# Patient Record
Sex: FEMALE | Race: WHITE | Employment: OTHER | URBAN - METROPOLITAN AREA
[De-identification: names, ages, dates, MRNs, and addresses within clinical notes are randomized per-mention and may not be internally consistent; named-entity substitution may affect disease eponyms.]

---

## 2017-03-06 NOTE — TELEPHONE ENCOUNTER
Patient reports that she is nearly out of BP meds. She is scheduled for a follow-up appt with Dr Mario Qureshi on 3/13/2017 (soonest available appt) but she will run out of medication before that appt.      Current Outpatient Prescriptions:  LOSARTAN POTASSIUM 50

## 2017-03-07 RX ORDER — LOSARTAN POTASSIUM 50 MG/1
TABLET ORAL
Qty: 30 TABLET | Refills: 0 | Status: SHIPPED | OUTPATIENT
Start: 2017-03-07 | End: 2017-03-07

## 2017-03-07 NOTE — TELEPHONE ENCOUNTER
Please advise in regards to refill request - patient has scheduled appt with [facility] 3/13/17 - almost out of meds - please advise

## 2017-03-08 RX ORDER — LOSARTAN POTASSIUM 50 MG/1
TABLET ORAL
Qty: 30 TABLET | Refills: 0 | Status: SHIPPED | OUTPATIENT
Start: 2017-03-08 | End: 2017-03-13

## 2017-03-13 ENCOUNTER — OFFICE VISIT (OUTPATIENT)
Dept: INTERNAL MEDICINE CLINIC | Facility: CLINIC | Age: 72
End: 2017-03-13

## 2017-03-13 VITALS
WEIGHT: 137.81 LBS | SYSTOLIC BLOOD PRESSURE: 158 MMHG | DIASTOLIC BLOOD PRESSURE: 88 MMHG | BODY MASS INDEX: 22.15 KG/M2 | HEART RATE: 103 BPM | HEIGHT: 66 IN

## 2017-03-13 DIAGNOSIS — E78.2 MIXED HYPERLIPIDEMIA: ICD-10-CM

## 2017-03-13 DIAGNOSIS — Z72.0 TOBACCO ABUSE: ICD-10-CM

## 2017-03-13 DIAGNOSIS — I10 UNCONTROLLED HYPERTENSION: Primary | ICD-10-CM

## 2017-03-13 DIAGNOSIS — M81.0 OSTEOPOROSIS: ICD-10-CM

## 2017-03-13 PROCEDURE — 99212 OFFICE O/P EST SF 10 MIN: CPT | Performed by: INTERNAL MEDICINE

## 2017-03-13 PROCEDURE — 99214 OFFICE O/P EST MOD 30 MIN: CPT | Performed by: INTERNAL MEDICINE

## 2017-03-13 RX ORDER — LOSARTAN POTASSIUM 100 MG/1
100 TABLET ORAL DAILY
Qty: 90 TABLET | Refills: 3 | Status: SHIPPED | OUTPATIENT
Start: 2017-03-13 | End: 2018-05-24

## 2017-03-14 NOTE — PROGRESS NOTES
HPI:    Patient ID: Lizzy Kellogg is a 80year old female.     HPI      HTN  Long standing history of hypertension  More than a year mary  Status::::: UNcontrolled:::::::::::::::::::::::::::::y   sympotms  :        Headache no  dizziness        no Review of Systems   Constitutional: Negative for fever, chills, activity change and fatigue. HENT: Negative for ear discharge, nosebleeds, postnasal drip, rhinorrhea, sinus pressure and sore throat.     Eyes: Negative for pain, discharge a Problem Relation Age of Onset   • Other[Other] [OTHER] Father      Cirrhosis   • Heart Disease Mother    • atrial fib [Other] [OTHER] Sister    • bowel resection [Other] [OTHER] Sister    • alive and well [Other] [OTHER] Daughter      x3      Social Hist CALCIUM      8.5-10.5 mg/dL 8.9   ALT (SGPT)      14-54 U/L 13 (L)   AST (SGOT)      15-41 U/L 17   ALKALINE PHOSPHATASE       U/L 74   Total Bilirubin      0.3-1.2 mg/dL 0.5   TOTAL PROTEIN      5.9-8.4 g/dL 6.7   Albumin      3.5-4.8 g/dL 3.7   G is 6 yrs out from the most recent one.  She had CBE with PCP recently and is scheduled for mammogram.  She can continue with PCP for yearly H+P with CBE and mammogram for surveillance of breast cancer.  If new signs or symptoms of recurrence, should be ref

## 2018-05-24 RX ORDER — LOSARTAN POTASSIUM 100 MG/1
TABLET ORAL
Qty: 30 TABLET | Refills: 0 | Status: SHIPPED | OUTPATIENT
Start: 2018-05-24 | End: 2018-06-04

## 2018-05-24 NOTE — TELEPHONE ENCOUNTER
Pt has scheduled a f/u appt on Monday 6/4/2018 and req a refill to hold her over to this appointment due to being almost out of meds, has 9 pills left which will last her until Saturday, 6/2/2018

## 2018-05-24 NOTE — TELEPHONE ENCOUNTER
Please advise on refill request    Call center please call and schedule an appointment. Thank You.     Hypertensive Medications  Protocol Criteria:  · Appointment scheduled in the past 6 months or in the next 3 months  · BMP or CMP in the past 12 months  ·

## 2018-05-25 RX ORDER — LOSARTAN POTASSIUM 100 MG/1
TABLET ORAL
Qty: 90 TABLET | Refills: 0 | OUTPATIENT
Start: 2018-05-25

## 2018-06-04 ENCOUNTER — OFFICE VISIT (OUTPATIENT)
Dept: INTERNAL MEDICINE CLINIC | Facility: CLINIC | Age: 73
End: 2018-06-04

## 2018-06-04 VITALS
TEMPERATURE: 98 F | WEIGHT: 134 LBS | BODY MASS INDEX: 21.53 KG/M2 | HEART RATE: 114 BPM | DIASTOLIC BLOOD PRESSURE: 76 MMHG | SYSTOLIC BLOOD PRESSURE: 174 MMHG | HEIGHT: 66 IN

## 2018-06-04 DIAGNOSIS — Z90.11 STATUS POST RIGHT MASTECTOMY: ICD-10-CM

## 2018-06-04 DIAGNOSIS — Z12.31 VISIT FOR SCREENING MAMMOGRAM: ICD-10-CM

## 2018-06-04 DIAGNOSIS — E78.2 MIXED HYPERLIPIDEMIA: ICD-10-CM

## 2018-06-04 DIAGNOSIS — D75.839 THROMBOCYTOSIS: ICD-10-CM

## 2018-06-04 DIAGNOSIS — Z85.3 HISTORY OF RIGHT BREAST CANCER: ICD-10-CM

## 2018-06-04 DIAGNOSIS — I10 ESSENTIAL HYPERTENSION: Primary | ICD-10-CM

## 2018-06-04 DIAGNOSIS — M81.0 AGE-RELATED OSTEOPOROSIS WITHOUT CURRENT PATHOLOGICAL FRACTURE: ICD-10-CM

## 2018-06-04 DIAGNOSIS — Z72.0 TOBACCO ABUSE: ICD-10-CM

## 2018-06-04 PROCEDURE — 99214 OFFICE O/P EST MOD 30 MIN: CPT | Performed by: INTERNAL MEDICINE

## 2018-06-04 PROCEDURE — G0463 HOSPITAL OUTPT CLINIC VISIT: HCPCS | Performed by: INTERNAL MEDICINE

## 2018-06-04 RX ORDER — AMLODIPINE BESYLATE 5 MG/1
5 TABLET ORAL DAILY
Qty: 30 TABLET | Refills: 11 | Status: SHIPPED | OUTPATIENT
Start: 2018-06-04 | End: 2019-05-20

## 2018-06-04 RX ORDER — LOSARTAN POTASSIUM 100 MG/1
100 TABLET ORAL
Qty: 30 TABLET | Refills: 11 | Status: SHIPPED | OUTPATIENT
Start: 2018-06-04 | End: 2019-06-21

## 2018-06-04 NOTE — PROGRESS NOTES
HPI:    Patient ID: Sandra Rosales is a 71year old female.     HPI    HTN  Long standing history of hypertension     sympotms  :        Headache no  dizziness        no                             Blurred vision no  palpitaionsSyncope no  Chest pain  no Psychiatric/Behavioral: Negative for agitation and behavioral problems. Current Outpatient Prescriptions:  Calcium Carbonate (CALTRATE 600 OR) Take by mouth.  Disp:  Rfl:    AmLODIPine Besylate 5 MG Oral Tab Take 1 tablet (5 mg total) by mouth d Conjunctivae and EOM are normal. Pupils are equal, round, and reactive to light. Right eye exhibits no discharge. Left eye exhibits no discharge. No scleral icterus. Neck: Neck supple. No thyromegaly present.    Cardiovascular: Normal rate, regular rhythm Hematocrit      35.0 - 48.0 % 46.1   MCV      80.0 - 100.0 fL 96.7   MCH      27.0 - 32.0 pg 32.5 (H)   MCHC      32.0 - 37.0 g/dl 33.6   RDW      11.0 - 15.0 % 12.8   Platelet Count      288 - 400 K/ (H)   MEAN PLATELET VOLUME      7.4 - 10.3 fL 9 pathological fracture  Plan: ALK PHOSPHATASE, BONE SPECIFIC, PTH, INTACT,         VITAMIN D, 25-HYDROXY, ENDOCRINOLOGY - INTERNAL        asymptoamtic  Monitor  Follow up in 1 mnth      Meds This Visit:  Signed Prescriptions Disp Refills    AmLODIPine Besyl

## 2018-06-11 ENCOUNTER — APPOINTMENT (OUTPATIENT)
Dept: LAB | Age: 73
End: 2018-06-11
Attending: INTERNAL MEDICINE
Payer: MEDICARE

## 2018-06-11 DIAGNOSIS — M81.0 AGE-RELATED OSTEOPOROSIS WITHOUT CURRENT PATHOLOGICAL FRACTURE: ICD-10-CM

## 2018-06-11 DIAGNOSIS — E78.2 MIXED HYPERLIPIDEMIA: ICD-10-CM

## 2018-06-11 DIAGNOSIS — I10 ESSENTIAL HYPERTENSION: ICD-10-CM

## 2018-06-11 PROCEDURE — 83970 ASSAY OF PARATHORMONE: CPT

## 2018-06-11 PROCEDURE — 82306 VITAMIN D 25 HYDROXY: CPT

## 2018-06-11 PROCEDURE — 80061 LIPID PANEL: CPT

## 2018-06-11 PROCEDURE — 80053 COMPREHEN METABOLIC PANEL: CPT

## 2018-06-11 PROCEDURE — 36415 COLL VENOUS BLD VENIPUNCTURE: CPT

## 2018-06-11 PROCEDURE — 85027 COMPLETE CBC AUTOMATED: CPT

## 2018-06-11 PROCEDURE — 84439 ASSAY OF FREE THYROXINE: CPT

## 2018-06-11 PROCEDURE — 84080 ASSAY ALKALINE PHOSPHATASES: CPT

## 2018-06-11 PROCEDURE — 84443 ASSAY THYROID STIM HORMONE: CPT

## 2018-06-14 ENCOUNTER — TELEPHONE (OUTPATIENT)
Dept: INTERNAL MEDICINE CLINIC | Facility: CLINIC | Age: 73
End: 2018-06-14

## 2018-06-15 RX ORDER — ERGOCALCIFEROL 1.25 MG/1
CAPSULE ORAL
Qty: 13 CAPSULE | Refills: 0 | OUTPATIENT
Start: 2018-06-15

## 2018-06-15 NOTE — TELEPHONE ENCOUNTER
Spoke with patient (verified name and ), reviewed information, Pt states that she feels fine and did not feel ill the day she had her blood drawn. She states that she always bruises easily and has since receiving chemo 20 years ago.     Discussed low cho

## 2018-06-15 NOTE — TELEPHONE ENCOUNTER
Jackie Yates MD   Em Rn Triage 11 hours ago (10:35 PM)      Low vit D     perscription sent to pharmacy for      WBC and platelets are high   Are you feeling well?    One of my nurses will call you   High lipids  Low cholesterol diet advised   Ot

## 2018-09-04 ENCOUNTER — NURSE TRIAGE (OUTPATIENT)
Dept: OTHER | Age: 73
End: 2018-09-04

## 2018-09-04 NOTE — TELEPHONE ENCOUNTER
Action Requested: Summary for Provider     []  Critical Lab, Recommendations Needed  [x] Need Additional Advice  []   FYI    []   Need Orders  [] Need Medications Sent to Pharmacy  []  Other     SUMMARY: Pt states pain originates left buttock and radiates

## 2018-09-05 NOTE — TELEPHONE ENCOUNTER
Patient called and stated she does feel and did not go to the ED. Her back pain is gone and her numbness is much better. She can walk. I offered an appointment and she refused. If needed she will call us back.     If the pain comes back she will helena

## 2018-09-09 RX ORDER — ERGOCALCIFEROL 1.25 MG/1
50000 CAPSULE ORAL
Qty: 3 CAPSULE | Refills: 3 | Status: SHIPPED | OUTPATIENT
Start: 2018-09-09 | End: 2019-07-03 | Stop reason: ALTCHOICE

## 2019-05-20 RX ORDER — AMLODIPINE BESYLATE 5 MG/1
TABLET ORAL
Qty: 90 TABLET | Refills: 0 | Status: SHIPPED | OUTPATIENT
Start: 2019-05-20 | End: 2019-07-03

## 2019-05-20 NOTE — TELEPHONE ENCOUNTER
Patient failed protocol, overdue for OV. Refilled for 90 days, mychart message sent.     Hypertensive Medications  Protocol Criteria:  · Appointment scheduled in the past 6 months or in the next 3 months  · BMP or CMP in the past 12 months  · Creatinine res

## 2019-06-21 NOTE — TELEPHONE ENCOUNTER
CSS, please contact patient and assist in scheduling overdue f/u or Px as has not been seen in over a year.   My [employer] message had previously been sent in May but pt has not yet read it      Hypertensive Medications  Protocol Criteria:  · Appointment schedul

## 2019-06-22 NOTE — TELEPHONE ENCOUNTER
pls advise, thanks in advance.      Future Appointments   Date Time Provider Saad Quiñones   7/3/2019  9:40 AM MD ELMO Mohan  ADO         Requested Prescriptions     Pending Prescriptions Disp Refills   • LOSARTAN 100 MG Oral Tab Ross Friendly

## 2019-06-23 RX ORDER — LOSARTAN POTASSIUM 100 MG/1
TABLET ORAL
Qty: 30 TABLET | Refills: 0 | Status: SHIPPED | OUTPATIENT
Start: 2019-06-23 | End: 2019-06-24

## 2019-06-26 RX ORDER — LOSARTAN POTASSIUM 100 MG/1
TABLET ORAL
Qty: 30 TABLET | Refills: 0 | OUTPATIENT
Start: 2019-06-26 | End: 2019-07-03

## 2019-07-03 ENCOUNTER — OFFICE VISIT (OUTPATIENT)
Dept: INTERNAL MEDICINE CLINIC | Facility: CLINIC | Age: 74
End: 2019-07-03
Payer: MEDICARE

## 2019-07-03 VITALS
SYSTOLIC BLOOD PRESSURE: 142 MMHG | HEART RATE: 114 BPM | HEIGHT: 66 IN | BODY MASS INDEX: 21.21 KG/M2 | WEIGHT: 132 LBS | TEMPERATURE: 98 F | DIASTOLIC BLOOD PRESSURE: 84 MMHG

## 2019-07-03 DIAGNOSIS — Z12.31 VISIT FOR SCREENING MAMMOGRAM: ICD-10-CM

## 2019-07-03 DIAGNOSIS — Z85.3 HISTORY OF RIGHT BREAST CANCER: ICD-10-CM

## 2019-07-03 DIAGNOSIS — F17.219 CIGARETTE NICOTINE DEPENDENCE WITH NICOTINE-INDUCED DISORDER: ICD-10-CM

## 2019-07-03 DIAGNOSIS — Z78.0 POSTMENOPAUSAL: ICD-10-CM

## 2019-07-03 DIAGNOSIS — Z72.0 TOBACCO ABUSE: ICD-10-CM

## 2019-07-03 DIAGNOSIS — Z00.00 ENCOUNTER FOR ANNUAL HEALTH EXAMINATION: ICD-10-CM

## 2019-07-03 DIAGNOSIS — M81.0 AGE-RELATED OSTEOPOROSIS WITHOUT CURRENT PATHOLOGICAL FRACTURE: ICD-10-CM

## 2019-07-03 DIAGNOSIS — Z00.00 ENCOUNTER FOR MEDICARE ANNUAL WELLNESS EXAM: Primary | ICD-10-CM

## 2019-07-03 DIAGNOSIS — E78.2 MIXED HYPERLIPIDEMIA: ICD-10-CM

## 2019-07-03 DIAGNOSIS — I10 ESSENTIAL HYPERTENSION: ICD-10-CM

## 2019-07-03 DIAGNOSIS — Z90.11 STATUS POST RIGHT MASTECTOMY: ICD-10-CM

## 2019-07-03 PROCEDURE — G0463 HOSPITAL OUTPT CLINIC VISIT: HCPCS | Performed by: INTERNAL MEDICINE

## 2019-07-03 PROCEDURE — 99213 OFFICE O/P EST LOW 20 MIN: CPT | Performed by: INTERNAL MEDICINE

## 2019-07-03 PROCEDURE — G0439 PPPS, SUBSEQ VISIT: HCPCS | Performed by: INTERNAL MEDICINE

## 2019-07-03 RX ORDER — AMLODIPINE BESYLATE 5 MG/1
TABLET ORAL
Qty: 90 TABLET | Refills: 3 | Status: SHIPPED | OUTPATIENT
Start: 2019-07-03 | End: 2020-08-31

## 2019-07-03 RX ORDER — AMLODIPINE BESYLATE 5 MG/1
TABLET ORAL
Qty: 90 TABLET | Refills: 3 | Status: SHIPPED | OUTPATIENT
Start: 2019-07-03 | End: 2020-08-18

## 2019-07-03 RX ORDER — LOSARTAN POTASSIUM 100 MG/1
100 TABLET ORAL
Qty: 90 TABLET | Refills: 3 | Status: SHIPPED | OUTPATIENT
Start: 2019-07-03 | End: 2020-07-14

## 2019-07-03 NOTE — PROGRESS NOTES
HPI:    Patient ID: Bola Calderon is a 68year old female.     HPI    /84 (BP Location: Right arm, Patient Position: Sitting, Cuff Size: adult)   Pulse 114   Temp 97.7 °F (36.5 °C) (Oral)   Ht 5' 6\" (1.676 m)   Wt 132 lb (59.9 kg)   LMP  (LMP Unk Alcohol/week: 2.4 oz      Types: 4 Cans of beer per week      Comment: occasionally    Drug use: No       PHYSICAL EXAM:    Physical Exam  The 10-year ASCVD risk score (Cas Blackburn, et al., 2013) is: 28%    Values used to calculate the score:      Age: 68

## 2019-07-14 NOTE — PATIENT INSTRUCTIONS
Suly Rice's SCREENING SCHEDULE   Tests on this list are recommended by your physician but may not be covered, or covered at this frequency, by your insurer. Please check with your insurance carrier before scheduling to verify coverage.    Martha Bonilla Colorectal Cancer Screening  Covered up to Age 76     Colonoscopy Screen   Covered every 10 years- more often if abnormal Colonoscopy due on 09/03/1996 Update Health Maintenance if applicable    Flex Sigmoidoscopy Screen  Covered every 5 years No results previous visit. Please get once after your 65th birthday    Pneumococcal 23 (Pneumovax)  Covered Once after 65 No orders found for this or any previous visit.  Please get once after your 65th birthday    Hepatitis B for Moderate/High Risk       No orders fo

## 2019-07-14 NOTE — PROGRESS NOTES
HPI:   Kellie Patel is a 68year old female who presents for a Medicare Subsequent Annual Wellness visit (Pt already had Initial Annual Wellness).       Her last annual assessment has been over 1 year: Annual Physical due on 09/03/1948    Patient is he Little interest or pleasure in doing things (over the last two weeks)?: Not at all  Feeling down, depressed, or hopeless (over the last two weeks)?: Not at all  PHQ-2 SCORE: 0     Advanced Directive:   She does NOT have a Living Will on file in [address] [place] [address] Rd[facility]ars GARZA Lab Results   Component Value Date    AST 19 06/11/2018    ALT 11 (L) 06/11/2018    CA 9.3 06/11/2018    ALB 3.9 06/11/2018    TSH 1.62 06/11/2018    CREATSERUM 0.66 06/11/2018    GLU 90 06/11/2018        CBC  (most recent labs)   Lab Results   Component °F (36.5 °C) (Oral)   Ht 5' 6\" (1.676 m)   Wt 132 lb (59.9 kg)   LMP  (LMP Unknown)   BMI 21.31 kg/m²  Estimated body mass index is 21.31 kg/m² as calculated from the following:    Height as of this encounter: 5' 6\" (1.676 m).     Weight as of this encoun diagnosis)  Plan: Patient is independent with ADL.s    Health screening   Colonoscopy, mammography, dexa scan  And routine eye exam advised.       (I10) Essential hypertension  Plan: URINE MICROSCOPIC W REFLEX CULTURE        /84 (BP Location: Right ar Smoking cessation advised         Diet assessment: good     PLAN:  The patient indicates understanding of these issues and agrees to the plan. Reinforced healthy diet, lifestyle, and exercise. No follow-ups on file.      Mitra Hutton MD, 7/13/2019 yrs age 21-68 or Pap+HPV every 5 yrs age 33-67, age 72 and older at high risk There are no preventive care reminders to display for this patient.  Update Health Maintenance if applicable    Chlamydia  Annually if high risk No results found for: CHLAMYDIA No Value   05/12/2016 0.55    No flowsheet data found. Drug Serum Conc  Annually No results found for: DIGOXIN, DIG, VALP No flowsheet data found.                Template: [facility]B MEDICARE ANNUAL ASSESSMENT FEMALE Gracy Meehan [[id number]]

## 2019-08-14 ENCOUNTER — APPOINTMENT (OUTPATIENT)
Dept: LAB | Age: 74
End: 2019-08-14
Attending: INTERNAL MEDICINE
Payer: MEDICARE

## 2019-08-14 DIAGNOSIS — E78.2 MIXED HYPERLIPIDEMIA: ICD-10-CM

## 2019-08-14 DIAGNOSIS — I10 ESSENTIAL HYPERTENSION: ICD-10-CM

## 2019-08-14 LAB
ALBUMIN SERPL-MCNC: 3.7 G/DL (ref 3.4–5)
ALBUMIN/GLOB SERPL: 1.1 {RATIO} (ref 1–2)
ALP LIVER SERPL-CCNC: 94 U/L (ref 55–142)
ALT SERPL-CCNC: 13 U/L (ref 13–56)
ANION GAP SERPL CALC-SCNC: 5 MMOL/L (ref 0–18)
AST SERPL-CCNC: 13 U/L (ref 15–37)
BILIRUB SERPL-MCNC: 0.6 MG/DL (ref 0.1–2)
BUN BLD-MCNC: 10 MG/DL (ref 7–18)
BUN/CREAT SERPL: 15.4 (ref 10–20)
CALCIUM BLD-MCNC: 9.1 MG/DL (ref 8.5–10.1)
CHLORIDE SERPL-SCNC: 109 MMOL/L (ref 98–112)
CHOLEST SMN-MCNC: 228 MG/DL (ref ?–200)
CO2 SERPL-SCNC: 26 MMOL/L (ref 21–32)
CREAT BLD-MCNC: 0.65 MG/DL (ref 0.55–1.02)
DEPRECATED RDW RBC AUTO: 43.7 FL (ref 35.1–46.3)
ERYTHROCYTE [DISTWIDTH] IN BLOOD BY AUTOMATED COUNT: 12.3 % (ref 11–15)
GLOBULIN PLAS-MCNC: 3.5 G/DL (ref 2.8–4.4)
GLUCOSE BLD-MCNC: 85 MG/DL (ref 70–99)
HCT VFR BLD AUTO: 44.2 % (ref 35–48)
HDLC SERPL-MCNC: 87 MG/DL (ref 40–59)
HGB BLD-MCNC: 15.2 G/DL (ref 12–16)
LDLC SERPL CALC-MCNC: 118 MG/DL (ref ?–100)
M PROTEIN MFR SERPL ELPH: 7.2 G/DL (ref 6.4–8.2)
MCH RBC QN AUTO: 33.1 PG (ref 26–34)
MCHC RBC AUTO-ENTMCNC: 34.4 G/DL (ref 31–37)
MCV RBC AUTO: 96.3 FL (ref 80–100)
NONHDLC SERPL-MCNC: 141 MG/DL (ref ?–130)
OSMOLALITY SERPL CALC.SUM OF ELEC: 288 MOSM/KG (ref 275–295)
PATIENT FASTING: YES
PATIENT FASTING: YES
PLATELET # BLD AUTO: 427 10(3)UL (ref 150–450)
POTASSIUM SERPL-SCNC: 4.5 MMOL/L (ref 3.5–5.1)
RBC # BLD AUTO: 4.59 X10(6)UL (ref 3.8–5.3)
RBC #/AREA URNS AUTO: 0 /HPF
SODIUM SERPL-SCNC: 140 MMOL/L (ref 136–145)
T4 FREE SERPL-MCNC: 0.8 NG/DL (ref 0.8–1.7)
TRIGL SERPL-MCNC: 116 MG/DL (ref 30–149)
TSI SER-ACNC: 1.76 MIU/ML (ref 0.36–3.74)
VLDLC SERPL CALC-MCNC: 23 MG/DL (ref 0–30)
WBC # BLD AUTO: 7.7 X10(3) UL (ref 4–11)
WBC #/AREA URNS AUTO: 1 /HPF

## 2019-08-14 PROCEDURE — 84443 ASSAY THYROID STIM HORMONE: CPT

## 2019-08-14 PROCEDURE — 36415 COLL VENOUS BLD VENIPUNCTURE: CPT

## 2019-08-14 PROCEDURE — 80061 LIPID PANEL: CPT

## 2019-08-14 PROCEDURE — 85027 COMPLETE CBC AUTOMATED: CPT

## 2019-08-14 PROCEDURE — 81015 MICROSCOPIC EXAM OF URINE: CPT

## 2019-08-14 PROCEDURE — 84439 ASSAY OF FREE THYROXINE: CPT

## 2019-08-14 PROCEDURE — 80053 COMPREHEN METABOLIC PANEL: CPT

## 2019-10-03 ENCOUNTER — HOSPITAL ENCOUNTER (OUTPATIENT)
Dept: MAMMOGRAPHY | Age: 74
Discharge: HOME | End: 2019-10-03
Attending: INTERNAL MEDICINE
Payer: MEDICARE

## 2019-10-03 ENCOUNTER — HOSPITAL ENCOUNTER (OUTPATIENT)
Dept: BONE DENSITY | Age: 74
Discharge: HOME | End: 2019-10-03
Attending: INTERNAL MEDICINE
Payer: MEDICARE

## 2019-10-03 DIAGNOSIS — Z12.31 VISIT FOR SCREENING MAMMOGRAM: ICD-10-CM

## 2019-10-03 DIAGNOSIS — Z78.0 POSTMENOPAUSAL: ICD-10-CM

## 2019-10-03 PROCEDURE — 77063 BREAST TOMOSYNTHESIS BI: CPT | Performed by: INTERNAL MEDICINE

## 2019-10-03 PROCEDURE — 77067 SCR MAMMO BI INCL CAD: CPT | Performed by: INTERNAL MEDICINE

## 2019-10-03 PROCEDURE — 77080 DXA BONE DENSITY AXIAL: CPT | Performed by: INTERNAL MEDICINE

## 2020-07-14 RX ORDER — LOSARTAN POTASSIUM 100 MG/1
TABLET ORAL
Qty: 30 TABLET | Refills: 0 | Status: SHIPPED | OUTPATIENT
Start: 2020-07-14 | End: 2020-08-17

## 2020-07-14 NOTE — TELEPHONE ENCOUNTER
Left message for pt to call back. When the patient returns the call please help make an appointment per the below.

## 2020-08-17 NOTE — TELEPHONE ENCOUNTER
Patient requests refill, pharmacy cannot initiate refill request. Patient has follow up appt 8/31    LOSARTAN 100 MG Oral Tab      amLODIPine Besylate 5 MG Oral Tab

## 2020-08-18 RX ORDER — LOSARTAN POTASSIUM 100 MG/1
100 TABLET ORAL DAILY
Qty: 30 TABLET | Refills: 0 | Status: SHIPPED | OUTPATIENT
Start: 2020-08-18 | End: 2020-08-31

## 2020-08-18 RX ORDER — AMLODIPINE BESYLATE 5 MG/1
TABLET ORAL
Qty: 30 TABLET | Refills: 0 | Status: SHIPPED | OUTPATIENT
Start: 2020-08-18 | End: 2020-08-31

## 2020-08-31 ENCOUNTER — PATIENT MESSAGE (OUTPATIENT)
Dept: INTERNAL MEDICINE CLINIC | Facility: CLINIC | Age: 75
End: 2020-08-31

## 2020-08-31 ENCOUNTER — VIRTUAL PHONE E/M (OUTPATIENT)
Dept: INTERNAL MEDICINE CLINIC | Facility: CLINIC | Age: 75
End: 2020-08-31
Payer: MEDICARE

## 2020-08-31 VITALS — DIASTOLIC BLOOD PRESSURE: 60 MMHG | SYSTOLIC BLOOD PRESSURE: 150 MMHG

## 2020-08-31 DIAGNOSIS — Z72.0 TOBACCO ABUSE: ICD-10-CM

## 2020-08-31 DIAGNOSIS — Z91.89 RISK FOR CORONARY ARTERY DISEASE GREATER THAN 20% IN NEXT 10 YEARS: ICD-10-CM

## 2020-08-31 DIAGNOSIS — I10 ESSENTIAL HYPERTENSION: Primary | ICD-10-CM

## 2020-08-31 DIAGNOSIS — E78.2 MIXED HYPERLIPIDEMIA: ICD-10-CM

## 2020-08-31 DIAGNOSIS — M81.0 AGE-RELATED OSTEOPOROSIS WITHOUT CURRENT PATHOLOGICAL FRACTURE: ICD-10-CM

## 2020-08-31 PROCEDURE — 99443 PHONE E/M BY PHYS 21-30 MIN: CPT | Performed by: INTERNAL MEDICINE

## 2020-08-31 RX ORDER — LOSARTAN POTASSIUM 100 MG/1
100 TABLET ORAL DAILY
Qty: 90 TABLET | Refills: 1 | Status: SHIPPED | OUTPATIENT
Start: 2020-08-31 | End: 2021-03-17

## 2020-08-31 RX ORDER — AMLODIPINE BESYLATE 10 MG/1
10 TABLET ORAL DAILY
Qty: 90 TABLET | Refills: 1 | Status: SHIPPED | OUTPATIENT
Start: 2020-08-31 | End: 2021-03-17

## 2020-08-31 NOTE — TELEPHONE ENCOUNTER
From: Bibiana Tao  To: Brandon Crawford MD  Sent: 8/31/2020 12:20 PM CDT  Subject: Prescription Question    I have a question my one prescription was called in but my other prescription was not for losartan will that be refilled

## 2020-08-31 NOTE — PROGRESS NOTES
HPI:    Patient ID: Rolan Carlos is a 69year old female. HPI  Virtual Telephone Check-In    Rolan Carlos verbally consents to a Virtual/Telephone Check-In visit on 08/31/20.   Patient has been referred to the [facility] website at [email or web address] dysuria, frequency, hematuria and urgency. Musculoskeletal: Negative for back pain, gait problem and joint swelling. Skin: Negative for rash. Neurological: Negative for syncope, weakness, light-headedness and headaches.    Hematological: Negative for tobacco: Current User    Alcohol use:  Yes      Alcohol/week: 4.0 standard drinks      Types: 4 Cans of beer per week      Comment: occasionally    Drug use: No       PHYSICAL EXAM:    Physical Exam  The 10-year ASCVD risk score (Eric Solis, et al., 2013) following visit was completed using two-way, real-time interactive audio and/or video communication.   This has been done in good misty to provide continuity of care in the best interest of the provider-patient relationship, due to the ongoing public health

## 2021-02-06 DIAGNOSIS — Z23 NEED FOR VACCINATION: ICD-10-CM

## 2021-03-17 ENCOUNTER — OFFICE VISIT (OUTPATIENT)
Dept: INTERNAL MEDICINE CLINIC | Facility: CLINIC | Age: 76
End: 2021-03-17
Payer: MEDICARE

## 2021-03-17 VITALS
DIASTOLIC BLOOD PRESSURE: 72 MMHG | HEIGHT: 66 IN | BODY MASS INDEX: 21.6 KG/M2 | SYSTOLIC BLOOD PRESSURE: 146 MMHG | HEART RATE: 103 BPM | WEIGHT: 134.38 LBS

## 2021-03-17 DIAGNOSIS — Z12.11 COLON CANCER SCREENING: ICD-10-CM

## 2021-03-17 DIAGNOSIS — R92.8 ABNORMAL MAMMOGRAM: ICD-10-CM

## 2021-03-17 DIAGNOSIS — Z72.0 TOBACCO ABUSE: ICD-10-CM

## 2021-03-17 DIAGNOSIS — D75.839 THROMBOCYTOSIS: ICD-10-CM

## 2021-03-17 DIAGNOSIS — Z85.3 HISTORY OF RIGHT BREAST CANCER: ICD-10-CM

## 2021-03-17 DIAGNOSIS — Z00.00 MEDICARE ANNUAL WELLNESS VISIT, SUBSEQUENT: Primary | ICD-10-CM

## 2021-03-17 DIAGNOSIS — E78.2 MIXED HYPERLIPIDEMIA: ICD-10-CM

## 2021-03-17 DIAGNOSIS — Z00.00 ENCOUNTER FOR ANNUAL HEALTH EXAMINATION: ICD-10-CM

## 2021-03-17 DIAGNOSIS — M85.80 OSTEOPENIA WITH HIGH RISK OF FRACTURE: ICD-10-CM

## 2021-03-17 DIAGNOSIS — I10 ESSENTIAL HYPERTENSION: ICD-10-CM

## 2021-03-17 DIAGNOSIS — Z91.89 RISK FOR CORONARY ARTERY DISEASE GREATER THAN 20% IN NEXT 10 YEARS: ICD-10-CM

## 2021-03-17 PROCEDURE — G0439 PPPS, SUBSEQ VISIT: HCPCS | Performed by: INTERNAL MEDICINE

## 2021-03-17 RX ORDER — LOSARTAN POTASSIUM 100 MG/1
100 TABLET ORAL DAILY
Qty: 90 TABLET | Refills: 1 | Status: SHIPPED | OUTPATIENT
Start: 2021-03-17 | End: 2021-09-07

## 2021-03-17 RX ORDER — AMLODIPINE BESYLATE 10 MG/1
10 TABLET ORAL DAILY
Qty: 90 TABLET | Refills: 1 | Status: SHIPPED | OUTPATIENT
Start: 2021-03-17 | End: 2021-09-07

## 2021-03-20 NOTE — PROGRESS NOTES
HPI:   Bola Calderon is a 77year old female who presents for a medicare annual exam      Her last annual assessment has been over 1 year: Annual Physical due on 07/03/2020         Fall/Risk Assessment   She has been screened for Falls and is low risk: risk.    Patient Care Team: Patient Care Team:  Heather Urbina MD as PCP - General (Internal Medicine)  Heather Urbina MD as PCP [facility]    Patient Active Problem List:     History of right breast cancer     Status post mastectomy     Thrombocytosis (HC sister. SOCIAL HISTORY:   She  reports that she has been smoking. She has a 53.00 pack-year smoking history. She uses smokeless tobacco. She reports current alcohol use of about 4.0 standard drinks of alcohol per week.  She reports that she does not use d trouble understanding things on the TV: No I have to strain to understand conversations: No   I have to worry about missing the telephone ring or doorbell: No I have trouble hearing conversations in a noisy background such as a crowded room or restaurant: sounds, S1 normal and S2 normal. No murmur heard. No gallop. Pulmonary:      Effort: Pulmonary effort is normal. No respiratory distress. Breath sounds: Normal breath sounds. No wheezing or rales. Chest:      Chest wall: No tenderness.    Abdomi dvsed    (Z91.89) Risk for coronary artery disease greater than 20% in next 10 years  Plan: modifyt risk for CAD\  Stop smoking  Healthy weight  Low fat diet    (Z85.3) History of right breast cancer  Plan: no known recurrence    (M85.80) Osteopenia with h Colonoscopy Never done Update Health Maintenance if applicable    Flex Sigmoidoscopy Screen every 10 years No results found for this or any previous visit. No flowsheet data found.      Fecal Occult Blood Annually No results found for: FOB No flowsheet data Zoster   Not covered by Medicare Part B No vaccine history found This may be covered with your pharmacy  prescription benefits      [facility] Internal Lab or Procedure External Lab or Procedure      Annual Monitoring of Persistent     Med

## 2021-03-20 NOTE — PATIENT INSTRUCTIONS
Migeul Rice's SCREENING SCHEDULE   Tests on this list are recommended by your physician but may not be covered, or covered at this frequency, by your insurer. Please check with your insurance carrier before scheduling to verify coverage.    Ravinder Paula if abnormal Colonoscopy Never done Update [facility] if applicable    Flex Sigmoidoscopy Screen  Covered every 5 years No results found for this or any previous visit. No flowsheet data found.      Fecal Occult Blood   Covered Annually No results fo orders found for this or any previous visit. Please get once after your 65th birthday    Hepatitis B for Moderate/High Risk       No orders found for this or any previous visit.  Medium/high risk factors:   End-stage renal disease   Hemophiliacs who receive

## 2021-05-07 ENCOUNTER — LAB ENCOUNTER (OUTPATIENT)
Dept: LAB | Age: 76
End: 2021-05-07
Attending: INTERNAL MEDICINE
Payer: MEDICARE

## 2021-05-07 DIAGNOSIS — M85.80 OSTEOPENIA WITH HIGH RISK OF FRACTURE: ICD-10-CM

## 2021-05-07 DIAGNOSIS — E78.2 MIXED HYPERLIPIDEMIA: ICD-10-CM

## 2021-05-07 DIAGNOSIS — I10 ESSENTIAL HYPERTENSION: ICD-10-CM

## 2021-05-07 PROCEDURE — 83970 ASSAY OF PARATHORMONE: CPT

## 2021-05-07 PROCEDURE — 82306 VITAMIN D 25 HYDROXY: CPT

## 2021-05-07 PROCEDURE — 81015 MICROSCOPIC EXAM OF URINE: CPT

## 2021-05-07 PROCEDURE — 80061 LIPID PANEL: CPT

## 2021-05-07 PROCEDURE — 36415 COLL VENOUS BLD VENIPUNCTURE: CPT

## 2021-05-07 PROCEDURE — 84439 ASSAY OF FREE THYROXINE: CPT

## 2021-05-07 PROCEDURE — 80053 COMPREHEN METABOLIC PANEL: CPT

## 2021-05-07 PROCEDURE — 84443 ASSAY THYROID STIM HORMONE: CPT

## 2021-05-07 PROCEDURE — 85027 COMPLETE CBC AUTOMATED: CPT

## 2021-05-07 PROCEDURE — 84080 ASSAY ALKALINE PHOSPHATASES: CPT

## 2021-05-12 RX ORDER — ERGOCALCIFEROL 1.25 MG/1
CAPSULE ORAL
Qty: 13 CAPSULE | Refills: 0 | Status: SHIPPED | OUTPATIENT
Start: 2021-05-12

## 2021-09-07 RX ORDER — LOSARTAN POTASSIUM 100 MG/1
100 TABLET ORAL DAILY
Qty: 90 TABLET | Refills: 1 | Status: SHIPPED | OUTPATIENT
Start: 2021-09-07 | End: 2022-03-02

## 2021-09-07 RX ORDER — AMLODIPINE BESYLATE 10 MG/1
10 TABLET ORAL DAILY
Qty: 90 TABLET | Refills: 1 | Status: SHIPPED | OUTPATIENT
Start: 2021-09-07 | End: 2022-03-02

## 2021-09-07 NOTE — TELEPHONE ENCOUNTER
Refill passed per [place] [employer], [facility] Protocol    Requested Prescriptions   Pending Prescriptions Disp Refills    LOSARTAN 100 MG Oral Tab [Pharmacy Med Name: LOSARTAN 100MG TABLETS] 90 tablet 1     Sig: TAKE 1 TABLET(100 MG) BY MOUTH DAILY        Hypertensive Medications Protocol Passed - 9/5/2021  5:13 PM        Passed - CMP or BMP in past 12 months        Passed - Appointment in past 6 or next 3 months        Passed - GFR Non- > 50     Lab Results   Component Value Date    GFRNAA 88 05/07/2021                  AMLODIPINE 10 MG Oral Tab [Pharmacy Med Name: AMLODIPINE BESYLATE 10MG TABLETS] 90 tablet 1     Sig: TAKE 1 TABLET(10 MG) BY MOUTH DAILY        Hypertensive Medications Protocol Passed - 9/5/2021  5:13 PM        Passed - CMP or BMP in past 12 months        Passed - Appointment in past 6 or next 3 months        Passed - GFR Non- > 50     Lab Results   Component Value Date    [facility] 05/07[facility]                     Recent Outpatient Visits              5 months ago Medicare annual wellness visit, subsequent    Swati Watson MD    Office Visit    1 year ago Essential hypertension    Swati Watson MD    Virtual Phone E/M    2 years ago Encounter for [employer] annual wellness exam    Swati Watson MD    Office Visit    3 years ago Essential hypertension    Fatmata Torres, Amarilys Chan MD    Office Visit    4 years ago Uncontrolled hypertension; advised compliance with meds.     Swati Watson MD    Office Visit

## 2022-03-02 RX ORDER — LOSARTAN POTASSIUM 100 MG/1
100 TABLET ORAL DAILY
Qty: 30 TABLET | Refills: 0 | Status: SHIPPED | OUTPATIENT
Start: 2022-03-02 | End: 2022-04-06

## 2022-03-02 RX ORDER — AMLODIPINE BESYLATE 10 MG/1
TABLET ORAL
Qty: 90 TABLET | Refills: 1 | Status: SHIPPED | OUTPATIENT
Start: 2022-03-02 | End: 2022-09-08

## 2022-03-07 NOTE — TELEPHONE ENCOUNTER
Appointment Request with Dr. Andre Page [phone]  From   Gabby Barney To   Pam Delgado and Delivered   3/3/2022  8:15 AM   Last Read in MyChart   3/3/2022  8:18 AM by Alyce Pedroza

## 2022-04-06 ENCOUNTER — OFFICE VISIT (OUTPATIENT)
Dept: INTERNAL MEDICINE CLINIC | Facility: CLINIC | Age: 77
End: 2022-04-06
Payer: MEDICARE

## 2022-04-06 VITALS
BODY MASS INDEX: 21.69 KG/M2 | HEART RATE: 94 BPM | HEIGHT: 66 IN | SYSTOLIC BLOOD PRESSURE: 138 MMHG | WEIGHT: 135 LBS | DIASTOLIC BLOOD PRESSURE: 80 MMHG

## 2022-04-06 DIAGNOSIS — Z00.00 ENCOUNTER FOR ANNUAL HEALTH EXAMINATION: ICD-10-CM

## 2022-04-06 DIAGNOSIS — E78.2 MIXED HYPERLIPIDEMIA: ICD-10-CM

## 2022-04-06 DIAGNOSIS — N64.4 BREAST PAIN, LEFT: ICD-10-CM

## 2022-04-06 DIAGNOSIS — Z85.3 HISTORY OF RIGHT BREAST CANCER: ICD-10-CM

## 2022-04-06 DIAGNOSIS — Z00.00 MEDICARE ANNUAL WELLNESS VISIT, SUBSEQUENT: Primary | ICD-10-CM

## 2022-04-06 DIAGNOSIS — Z91.89 RISK FOR CORONARY ARTERY DISEASE GREATER THAN 20% IN NEXT 10 YEARS: ICD-10-CM

## 2022-04-06 DIAGNOSIS — I10 ESSENTIAL HYPERTENSION: ICD-10-CM

## 2022-04-06 DIAGNOSIS — Z72.0 TOBACCO ABUSE: ICD-10-CM

## 2022-04-06 PROCEDURE — G0439 PPPS, SUBSEQ VISIT: HCPCS | Performed by: INTERNAL MEDICINE

## 2022-04-06 RX ORDER — LOSARTAN POTASSIUM 100 MG/1
100 TABLET ORAL DAILY
Qty: 90 TABLET | Refills: 1 | Status: SHIPPED | OUTPATIENT
Start: 2022-04-06

## 2022-05-18 ENCOUNTER — LAB ENCOUNTER (OUTPATIENT)
Dept: LAB | Age: 77
End: 2022-05-18
Attending: INTERNAL MEDICINE
Payer: MEDICARE

## 2022-05-18 DIAGNOSIS — E78.2 MIXED HYPERLIPIDEMIA: ICD-10-CM

## 2022-05-18 LAB
ALBUMIN SERPL-MCNC: 3.5 G/DL (ref 3.4–5)
ALBUMIN/GLOB SERPL: 0.9 {RATIO} (ref 1–2)
ALP LIVER SERPL-CCNC: 90 U/L
ALT SERPL-CCNC: 18 U/L
ANION GAP SERPL CALC-SCNC: 4 MMOL/L (ref 0–18)
AST SERPL-CCNC: 14 U/L (ref 15–37)
BILIRUB SERPL-MCNC: 0.5 MG/DL (ref 0.1–2)
BILIRUB UR QL: NEGATIVE
BUN BLD-MCNC: 12 MG/DL (ref 7–18)
BUN/CREAT SERPL: 17.4 (ref 10–20)
CALCIUM BLD-MCNC: 9.2 MG/DL (ref 8.5–10.1)
CHLORIDE SERPL-SCNC: 107 MMOL/L (ref 98–112)
CHOLEST SERPL-MCNC: 232 MG/DL (ref ?–200)
CLARITY UR: CLEAR
CO2 SERPL-SCNC: 28 MMOL/L (ref 21–32)
COLOR UR: YELLOW
CREAT BLD-MCNC: 0.69 MG/DL
DEPRECATED RDW RBC AUTO: 44.5 FL (ref 35.1–46.3)
ERYTHROCYTE [DISTWIDTH] IN BLOOD BY AUTOMATED COUNT: 12.6 % (ref 11–15)
FASTING PATIENT LIPID ANSWER: YES
FASTING STATUS PATIENT QL REPORTED: YES
GLOBULIN PLAS-MCNC: 4 G/DL (ref 2.8–4.4)
GLUCOSE BLD-MCNC: 85 MG/DL (ref 70–99)
GLUCOSE UR-MCNC: NEGATIVE MG/DL
HCT VFR BLD AUTO: 45.1 %
HDLC SERPL-MCNC: 79 MG/DL (ref 40–59)
HGB BLD-MCNC: 15.1 G/DL
HGB UR QL STRIP.AUTO: NEGATIVE
KETONES UR-MCNC: NEGATIVE MG/DL
LDLC SERPL CALC-MCNC: 120 MG/DL (ref ?–100)
LEUKOCYTE ESTERASE UR QL STRIP.AUTO: NEGATIVE
MCH RBC QN AUTO: 32.3 PG (ref 26–34)
MCHC RBC AUTO-ENTMCNC: 33.5 G/DL (ref 31–37)
MCV RBC AUTO: 96.4 FL
NITRITE UR QL STRIP.AUTO: NEGATIVE
NONHDLC SERPL-MCNC: 153 MG/DL (ref ?–130)
OSMOLALITY SERPL CALC.SUM OF ELEC: 287 MOSM/KG (ref 275–295)
PH UR: 6.5 [PH] (ref 5–8)
PLATELET # BLD AUTO: 474 10(3)UL (ref 150–450)
POTASSIUM SERPL-SCNC: 4.3 MMOL/L (ref 3.5–5.1)
PROT SERPL-MCNC: 7.5 G/DL (ref 6.4–8.2)
PROT UR-MCNC: NEGATIVE MG/DL
RBC # BLD AUTO: 4.68 X10(6)UL
SODIUM SERPL-SCNC: 139 MMOL/L (ref 136–145)
SP GR UR STRIP: 1.01 (ref 1–1.03)
T4 FREE SERPL-MCNC: 0.9 NG/DL (ref 0.8–1.7)
TRIGL SERPL-MCNC: 194 MG/DL (ref 30–149)
TSI SER-ACNC: 1.69 MIU/ML (ref 0.36–3.74)
UROBILINOGEN UR STRIP-ACNC: 0.2
VLDLC SERPL CALC-MCNC: 34 MG/DL (ref 0–30)
WBC # BLD AUTO: 7 X10(3) UL (ref 4–11)

## 2022-05-18 PROCEDURE — 80053 COMPREHEN METABOLIC PANEL: CPT

## 2022-05-18 PROCEDURE — 85027 COMPLETE CBC AUTOMATED: CPT

## 2022-05-18 PROCEDURE — 80061 LIPID PANEL: CPT

## 2022-05-18 PROCEDURE — 84443 ASSAY THYROID STIM HORMONE: CPT

## 2022-05-18 PROCEDURE — 36415 COLL VENOUS BLD VENIPUNCTURE: CPT

## 2022-05-18 PROCEDURE — 84439 ASSAY OF FREE THYROXINE: CPT

## 2022-09-08 RX ORDER — AMLODIPINE BESYLATE 10 MG/1
10 TABLET ORAL DAILY
Qty: 90 TABLET | Refills: 1 | Status: SHIPPED | OUTPATIENT
Start: 2022-09-08

## 2022-09-08 NOTE — TELEPHONE ENCOUNTER
Refill passed per [address] [place] protocol.    Requested Prescriptions   Pending Prescriptions Disp Refills    AMLODIPINE 10 MG Oral Tab [Pharmacy Med Name: AMLODIPINE BESYLATE 10MG TABLETS] 90 tablet 1     Sig: TAKE 1 TABLET(10 MG) BY MOUTH DAILY        Hypertensive Medications Protocol Passed - 9/8/2022  3:47 AM        Passed - In person appointment in the past 12 or next 3 months       Recent Outpatient Visits              5 months ago Medicare annual wellness visit, subsequent    Libra Correa MD    Office Visit    1 year ago [facility] annual wellness visit, subsequent    Libra Correa MD    Office Visit    2 years ago Essential hypertension    Libra Correa MD    Virtual Phone E/M    3 years ago Encounter for [employer] annual wellness exam    Libra Correa MD    Office Visit    4 years ago Essential hypertension    [address], [address], Jimmy Hinton MD    Office Visit                 Passed - Last BP reading less than 140/90     BP Readings from Last 1 Encounters:  04/06/22 : 138/80                Passed - CMP or BMP in past 6 months     Recent Results (from the past 4392 hour(s))   COMP METABOLIC PANEL (14)    Collection Time: 05/18/22  7:24 AM   Result Value Ref Range    Glucose 85 70 - 99 mg/dL    Sodium 139 136 - 145 mmol/L    Potassium 4.3 3.5 - 5.1 mmol/L    Chloride 107 98 - 112 mmol/L    CO2 28.0 21.0 - 32.0 mmol/L    Anion Gap 4 0 - 18 mmol/L    BUN 12 7 - 18 mg/dL    Creatinine 0.69 0.55 - 1.02 mg/dL    BUN/CREA Ratio 17.4 10.0 - 20.0    Calcium, Total 9.2 8.5 - 10.1 mg/dL    Calculated Osmolality 287 275 - 295 mOsm/kg    GFR, Non- 85 >=60    GFR, -American 98 >=60    ALT 18 13 - 56 U/L    AST 14 (L) 15 - 37 U/L    Alkaline Phosphatase 90 55 - 142 U/L    Bilirubin, Total 0.5 0.1 - 2.0 mg/dL    Total Protein 7.5 6.4 - 8.2 g/dL    Albumin 3.5 3.4 - 5.0 g/dL    Globulin  4.0 2.8 - 4.4 g/dL    A/G Ratio 0.9 (L) 1.0 - 2.0    Patient Fasting for CMP? Yes      *Note: Due to a large number of results and/or encounters for the requested time period, some results have not been displayed. A complete set of results can be found in Results Review.                  Passed - In person appointment or virtual visit in the past 6 months       Recent Outpatient Visits              5 months ago Medicare annual wellness visit, subsequent    Amarilys Baldwin MD    Office Visit    1 year ago [employer] annual wellness visit, subsequent    Swati Watson MD    Office Visit    2 years ago Essential hypertension    Swati Watson MD    Virtual Phone E/M    3 years ago Encounter for [employer] annual wellness exam    Swati Watson MD    Office Visit    4 years ago Essential hypertension    Swati Watson MD    Office Visit                 Passed - GFR > 50     No results found for: [facility]                   Recent Outpatient Visits              5 months ago Medicare annual wellness visit, subsequent    Swati Watson MD    Office Visit    1 year ago [employer] annual wellness visit, subsequent    Swati Watson MD    Office Visit    2 years ago Essential hypertension    Swati Watson MD    Virtual Phone E/M    3 years ago Encounter for [employer] annual wellness exam    Swati Watson MD    Office Visit    4 years ago Essential hypertension    Amarilys Baldwin MD    Office Visit

## 2022-09-29 RX ORDER — LOSARTAN POTASSIUM 100 MG/1
100 TABLET ORAL DAILY
Qty: 90 TABLET | Refills: 1 | Status: SHIPPED | OUTPATIENT
Start: 2022-09-29

## 2022-09-30 NOTE — TELEPHONE ENCOUNTER
Refill passed per [facility] protocol   Requested Prescriptions   Pending Prescriptions Disp Refills    losartan 100 MG Oral Tab 90 tablet 1     Sig: Take 1 tablet (100 mg total) by mouth daily.         Hypertensive Medications Protocol Passed - 9/29/2022  3:21 PM        Passed - In person appointment in the past 12 or next 3 months       Recent Outpatient Visits              5 months ago Medicare annual wellness visit, subsequent    Astrid Duarte MD    Office Visit    1 year ago Nick Ayon annual wellness visit, subsequent    [employer] [facility], [address], Iron Maurice MD    Office Visit    2 years ago Essential hypertension    Astrid Duarte MD    Virtual Phone E/M    3 years ago Encounter for [employer] annual wellness exam    Astrid Duarte MD    Office Visit    4 years ago Essential hypertension    [place] [facility], [facility], [address], Iron Maurice MD    Office Visit                 Passed - Last BP reading less than 140/90     BP Readings from Last 1 Encounters:  04/06/22 : 138/80                Passed - CMP or BMP in past 6 months     Recent Results (from the past 4392 hour(s))   COMP METABOLIC PANEL (14)    Collection Time: 05/18/22  7:24 AM   Result Value Ref Range    Glucose 85 70 - 99 mg/dL    Sodium 139 136 - 145 mmol/L    Potassium 4.3 3.5 - 5.1 mmol/L    Chloride 107 98 - 112 mmol/L    CO2 28.0 21.0 - 32.0 mmol/L    Anion Gap 4 0 - 18 mmol/L    BUN 12 7 - 18 mg/dL    Creatinine 0.69 0.55 - 1.02 mg/dL    BUN/CREA Ratio 17.4 10.0 - 20.0    Calcium, Total 9.2 8.5 - 10.1 mg/dL    Calculated Osmolality 287 275 - 295 mOsm/kg    GFR, Non- 85 >=60    GFR, -American 98 >=60    ALT 18 13 - 56 U/L    AST 14 (L) 15 - 37 U/L    Alkaline Phosphatase 90 55 - 142 U/L    Bilirubin, Total 0.5 0.1 - 2.0 mg/dL    Total Protein 7.5 6.4 - 8.2 g/dL    Albumin 3.5 3.4 - 5.0 g/dL    Globulin  4.0 2.8 - 4.4 g/dL    A/G Ratio 0.9 (L) 1.0 - 2.0    Patient Fasting for CMP? Yes      *Note: Due to a large number of results and/or encounters for the requested time period, some results have not been displayed. A complete set of results can be found in Results Review.                  Passed - In person appointment or virtual visit in the past 6 months       Recent Outpatient Visits              5 months ago Medicare annual wellness visit, subsequent    Arthur Burdick MD    Office Visit    1 year ago Nick Ayon annual wellness visit, subsequent    Shannan Meier MD    Office Visit    2 years ago Essential hypertension    Shannan Meier MD    Virtual Phone E/M    3 years ago Encounter for [employer] annual wellness exam    Shannan Meier MD    Office Visit    4 years ago Essential hypertension    Shannan Meier, [address] [place] [address] or GFRNAA > 50     GFR Evaluation  GFRNAA: 85 , resulted on 5/18/2022

## 2023-03-30 RX ORDER — LOSARTAN POTASSIUM 100 MG/1
100 TABLET ORAL DAILY
Qty: 90 TABLET | Refills: 0 | Status: SHIPPED | OUTPATIENT
Start: 2023-03-30

## 2023-03-30 NOTE — TELEPHONE ENCOUNTER
Protocol failed or has No Protocol, please review  Requested Prescriptions   Pending Prescriptions Disp Refills    LOSARTAN 100 MG Oral Tab [Pharmacy Med Name: LOSARTAN 100MG TABLETS] 90 tablet 1     Sig: TAKE 1 TABLET(100 MG) BY MOUTH DAILY       Hypertensive Medications Protocol Failed - 3/29/2023  5:23 PM        Failed - CMP or BMP in past 6 months     No results found for this or any previous visit (from the past 4392 hour(s)).             Failed - In person appointment or virtual visit in the past 6 months     Recent Outpatient Visits              11 months ago Medicare annual wellness visit, subsequent    Khloe Palma MD    Office Visit    2 years ago [facility] annual wellness visit, subsequent    Khloe Palma MD    Office Visit    2 years ago Essential hypertension    Monica Bain, Iam Rogers MD    Virtual Phone E/M    3 years ago [facility] annual wellness exam    Iam Ying MD    Office Visit    4 years ago Essential hypertension    Khloe Palma MD    Office Visit                      Passed - In person appointment in the past 12 or next 3 months     Recent Outpatient Visits              11 months ago Medicare annual wellness visit, subsequent    Khloe Palma MD    Office Visit    2 years ago [facility] annual wellness visit, subsequent    Khloe Palma MD    Office Visit    2 years ago Essential hypertension    Mehdi Mejia, Kerri Sanchez MD    Virtual Phone E/M    3 years ago [facility]    Monica Bain, [address] [place] [address], Derral Romberg, MD    Office Visit    4 years ago Essential hypertension    [facility], Ale Walker MD    Office Visit                      Passed - Last BP reading less than 140/90     BP Readings from Last 1 Encounters:  04/06/22 : 138/80              Passed - EGFRCR or GFRNAA > 50     GFR Evaluation  GFRNAA: 85 , resulted on 5/18/2022               Recent Outpatient Visits              11 months ago Medicare annual wellness visit, subsequent    [facility], Ale Walker MD    Office Visit    2 years ago [facility] annual wellness visit, subsequent    Noemi Villanueva MD    Office Visit    2 years ago Essential hypertension    Noemi Villanueva MD    Virtual Phone E/M    3 years ago Encounter for [employer] annual wellness exam    Noemi Villanueva MD    Office Visit    4 years ago Essential hypertension    [address], [address], Ale Walker MD    Office Visit

## 2023-06-15 RX ORDER — AMLODIPINE BESYLATE 10 MG/1
TABLET ORAL
Qty: 90 TABLET | Refills: 0 | OUTPATIENT
Start: 2023-06-15

## 2023-06-29 RX ORDER — LOSARTAN POTASSIUM 100 MG/1
100 TABLET ORAL DAILY
Qty: 30 TABLET | Refills: 0 | Status: SHIPPED | OUTPATIENT
Start: 2023-06-29 | End: 2023-06-30

## 2023-06-30 RX ORDER — LOSARTAN POTASSIUM 100 MG/1
TABLET ORAL
Qty: 90 TABLET | Refills: 0 | OUTPATIENT
Start: 2023-06-30

## 2023-06-30 RX ORDER — LOSARTAN POTASSIUM 100 MG/1
100 TABLET ORAL DAILY
Qty: 30 TABLET | Refills: 0 | Status: SHIPPED | OUTPATIENT
Start: 2023-06-30

## 2023-07-26 ENCOUNTER — OFFICE VISIT (OUTPATIENT)
Dept: INTERNAL MEDICINE CLINIC | Facility: CLINIC | Age: 78
End: 2023-07-26

## 2023-07-26 VITALS
HEART RATE: 105 BPM | DIASTOLIC BLOOD PRESSURE: 75 MMHG | HEIGHT: 66 IN | WEIGHT: 136 LBS | SYSTOLIC BLOOD PRESSURE: 147 MMHG | BODY MASS INDEX: 21.86 KG/M2

## 2023-07-26 DIAGNOSIS — Z12.31 VISIT FOR SCREENING MAMMOGRAM: ICD-10-CM

## 2023-07-26 DIAGNOSIS — Z72.0 TOBACCO ABUSE: ICD-10-CM

## 2023-07-26 DIAGNOSIS — Z00.00 MEDICARE ANNUAL WELLNESS VISIT, SUBSEQUENT: Primary | ICD-10-CM

## 2023-07-26 DIAGNOSIS — Z87.891 PERSONAL HISTORY OF NICOTINE DEPENDENCE: ICD-10-CM

## 2023-07-26 DIAGNOSIS — E78.2 MIXED HYPERLIPIDEMIA: ICD-10-CM

## 2023-07-26 DIAGNOSIS — Z00.00 ENCOUNTER FOR ANNUAL HEALTH EXAMINATION: ICD-10-CM

## 2023-07-26 DIAGNOSIS — E55.9 VITAMIN D DEFICIENCY: ICD-10-CM

## 2023-07-26 DIAGNOSIS — Z85.3 HISTORY OF RIGHT BREAST CANCER: ICD-10-CM

## 2023-07-26 DIAGNOSIS — I10 ESSENTIAL HYPERTENSION: ICD-10-CM

## 2023-07-26 DIAGNOSIS — Z78.0 POSTMENOPAUSAL: ICD-10-CM

## 2023-07-26 PROCEDURE — 1126F AMNT PAIN NOTED NONE PRSNT: CPT | Performed by: INTERNAL MEDICINE

## 2023-07-26 PROCEDURE — G0439 PPPS, SUBSEQ VISIT: HCPCS | Performed by: INTERNAL MEDICINE

## 2023-07-26 PROCEDURE — 99213 OFFICE O/P EST LOW 20 MIN: CPT | Performed by: INTERNAL MEDICINE

## 2023-07-26 RX ORDER — AMLODIPINE BESYLATE 10 MG/1
10 TABLET ORAL DAILY
Qty: 90 TABLET | Refills: 1 | Status: SHIPPED | OUTPATIENT
Start: 2023-07-26

## 2023-07-26 RX ORDER — ACETAMINOPHEN 160 MG
2000 TABLET,DISINTEGRATING ORAL DAILY
COMMUNITY

## 2023-07-26 RX ORDER — LOSARTAN POTASSIUM 100 MG/1
100 TABLET ORAL DAILY
Qty: 90 TABLET | Refills: 1 | Status: SHIPPED | OUTPATIENT
Start: 2023-07-26

## 2023-08-08 ENCOUNTER — HOSPITAL ENCOUNTER (OUTPATIENT)
Age: 78
Discharge: ANOTHER HOSPITAL | End: 2023-08-08
Payer: MEDICARE

## 2023-08-08 ENCOUNTER — APPOINTMENT (OUTPATIENT)
Dept: CT IMAGING | Facility: HOSPITAL | Age: 78
End: 2023-08-08
Attending: EMERGENCY MEDICINE
Payer: MEDICARE

## 2023-08-08 ENCOUNTER — HOSPITAL ENCOUNTER (EMERGENCY)
Facility: HOSPITAL | Age: 78
Discharge: HOME | End: 2023-08-08
Attending: EMERGENCY MEDICINE
Payer: MEDICARE

## 2023-08-08 VITALS
HEART RATE: 116 BPM | DIASTOLIC BLOOD PRESSURE: 84 MMHG | OXYGEN SATURATION: 98 % | TEMPERATURE: 98 F | RESPIRATION RATE: 24 BRPM | SYSTOLIC BLOOD PRESSURE: 154 MMHG

## 2023-08-08 VITALS
TEMPERATURE: 97 F | WEIGHT: 134 LBS | RESPIRATION RATE: 22 BRPM | BODY MASS INDEX: 21.53 KG/M2 | DIASTOLIC BLOOD PRESSURE: 88 MMHG | HEART RATE: 98 BPM | SYSTOLIC BLOOD PRESSURE: 152 MMHG | HEIGHT: 66 IN | OXYGEN SATURATION: 98 %

## 2023-08-08 DIAGNOSIS — S50.812A ABRASION OF LEFT FOREARM, INITIAL ENCOUNTER: ICD-10-CM

## 2023-08-08 DIAGNOSIS — S01.01XA SCALP LACERATION, INITIAL ENCOUNTER: Primary | ICD-10-CM
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
## 2023-08-08 DIAGNOSIS — S09.90XA INJURY OF HEAD, INITIAL ENCOUNTER: ICD-10-CM

## 2023-08-08 DIAGNOSIS — S01.01XA LACERATION OF SCALP WITHOUT FOREIGN BODY, INITIAL ENCOUNTER: Primary | ICD-10-CM

## 2023-08-08 DIAGNOSIS — R00.0 TACHYCARDIA: ICD-10-CM

## 2023-08-08 LAB
ATRIAL RATE: 105 BPM
P AXIS: 47 DEGREES
P-R INTERVAL: 160 MS
Q-T INTERVAL: 342 MS
QRS DURATION: 78 MS
QTC CALCULATION (BEZET): 452 MS
R AXIS: -4 DEGREES
T AXIS: 31 DEGREES
VENTRICULAR RATE: 105 BPM

## 2023-08-08 PROCEDURE — 12002 RPR S/N/AX/GEN/TRNK2.6-7.5CM: CPT

## 2023-08-08 PROCEDURE — 99284 EMERGENCY DEPT VISIT MOD MDM: CPT

## 2023-08-08 PROCEDURE — 90471 IMMUNIZATION ADMIN: CPT

## 2023-08-08 PROCEDURE — 93000 ELECTROCARDIOGRAM COMPLETE: CPT | Performed by: NURSE PRACTITIONER

## 2023-08-08 PROCEDURE — 99214 OFFICE O/P EST MOD 30 MIN: CPT | Performed by: NURSE PRACTITIONER

## 2023-08-08 PROCEDURE — 70450 CT HEAD/BRAIN W/O DYE: CPT | Performed by: EMERGENCY MEDICINE

## 2023-08-08 RX ORDER — LIDOCAINE HCL/EPINEPHRINE/PF 2%-1:200K
VIAL (ML) INJECTION
Status: COMPLETED
Start: 2023-08-08 | End: 2023-08-08

## 2023-08-08 RX ORDER — LIDOCAINE HCL/EPINEPHRINE/PF 2%-1:200K
20 VIAL (ML) INJECTION ONCE
Status: COMPLETED | OUTPATIENT
Start: 2023-08-08 | End: 2023-08-08

## 2023-08-08 NOTE — ED INITIAL ASSESSMENT (HPI)
Patient arrives with reports of a fall last night around 2100. Reports missing a step going into her house and falling backward and hitting her head on the cement. Denies LOC, dizziness, change in vision or nausea. No blood thinners reported. Bleeding controlled.

## 2023-08-08 NOTE — DISCHARGE INSTRUCTIONS
Return if headache vomiting change in behavior  Keep wounds clean with soap and water otherwise dry  Staple removal in 7 days

## 2023-08-08 NOTE — ED INITIAL ASSESSMENT (HPI)
Pt in [address] [place] [address] for c/o fall after last night when she missed step. States hit back of head and L forearm. Pt states she drink alcohol daily. Had 2 beer prior to fall. Pt w wound to back of head and abrasion to back of L forearm.

## 2023-08-10 ENCOUNTER — PATIENT OUTREACH (OUTPATIENT)
Dept: CASE MANAGEMENT | Age: 78
End: 2023-08-10

## 2023-08-10 NOTE — PROGRESS NOTES
VM received; pt returning call and requesting assistance w/scheduling appt w/PCP. Follow-Ups:  PCP - Lorenzo Horn MD in 1 week (8/8/2023);  For suture removal

## 2023-08-11 ENCOUNTER — PATIENT MESSAGE (OUTPATIENT)
Dept: INTERNAL MEDICINE CLINIC | Facility: CLINIC | Age: 78
End: 2023-08-11

## 2023-08-11 NOTE — PROGRESS NOTES
2nd attempt ED f/up apt request   PCP -decline, pt stated she is going out of town  Closing encounter

## 2024-01-25 RX ORDER — AMLODIPINE BESYLATE 10 MG/1
10 TABLET ORAL DAILY
Qty: 90 TABLET | Refills: 1 | Status: SHIPPED | OUTPATIENT
Start: 2024-01-25

## 2024-01-25 RX ORDER — LOSARTAN POTASSIUM 100 MG/1
100 TABLET ORAL DAILY
Qty: 90 TABLET | Refills: 1 | Status: SHIPPED | OUTPATIENT
Start: 2024-01-25

## 2024-01-25 NOTE — TELEPHONE ENCOUNTER
Please review.  Protocol failed / No protocol.    Requested Prescriptions   Pending Prescriptions Disp Refills    AMLODIPINE 10 MG Oral Tab [Pharmacy Med Name: AMLODIPINE BESYLATE 10MG TABLETS] 90 tablet 1     Sig: TAKE 1 TABLET(10 MG) BY MOUTH DAILY       Hypertensive Medications Protocol Failed - 1/23/2024 11:21 AM        Failed - Last BP reading less than 140/90     BP Readings from Last 1 Encounters:   08/08/23 152/88               Failed - CMP or BMP in past 6 months     No results found for this or any previous visit (from the past 4392 hour(s)).            Failed - In person appointment or virtual visit in the past 6 months     Recent Outpatient Visits              6 months ago Medicare annual wellness visit, subsequent    [facility], Maelen, MD    Office Visit    1 year ago Medicare annual wellness visit, subsequent    [facility]    Office Visit    2 years ago Medicare annual wellness visit, subsequent    [facility] Addison Pantano, Maelen, MD    Office Visit    3 years ago Essential hypertension    [facility] Pant[facility], Maelen, MD    Virtual Phone E/M    4 years ago Encounter for Medicare annual wellness exam    [facility] Pant[facility], Maelen, MD    Office Visit                      Failed - EGFRCR or GFRNAA > 50     GFR Evaluation            Passed - In person appointment in the past 12 or next 3 months     Recent Outpatient Visits              6 months ago Medicare annual wellness visit, subsequent    [facility] Pant[facility], Maelen, MD    Office Visit    1 year ago Medicare annual wellness visit, subsequent    [facility] Pant[facility], Maelen, MD    Office Visit    2 years ago Medicare annual wellness visit, subsequent     [facility], Maelen, MD    Office Visit    3 years ago Essential hypertension    [facility]    Virtual Phone E/M    4 years ago Encounter for Medicare annual wellness exam    [facility], Maelen, MD    Office Visit                               Recent Outpatient Visits              6 months ago Medicare annual wellness visit, subsequent    [facility]    Office Visit    1 year ago Medicare annual wellness visit, subsequent    [facility]    Office Visit    2 years ago Medicare annual wellness visit, subsequent    [facility]    Office Visit    3 years ago Essential hypertension    [facility]    Virtual Phone E/M    4 years ago Encounter for Medicare annual wellness exam    [facility]    Office Visit

## 2024-01-25 NOTE — TELEPHONE ENCOUNTER
Please review. Protocol Failed or has No Protocol.    Requested Prescriptions   Pending Prescriptions Disp Refills    LOSARTAN 100 MG Oral Tab [Pharmacy Med Name: LOSARTAN 100MG TABLETS] 90 tablet 1     Sig: TAKE 1 TABLET(100 MG) BY MOUTH DAILY       Hypertensive Medications Protocol Failed - 1/24/2024 11:26 AM        Failed - Last BP reading less than 140/90     BP Readings from Last 1 Encounters:   08/08/23 152/88               Failed - CMP or BMP in past 6 months     No results found for this or any previous visit (from the past 4392 hour(s)).            Failed - In person appointment or virtual visit in the past 6 months     Recent Outpatient Visits              6 months ago Medicare annual wellness visit, subsequent    [facility] Maelen, [facility]    Office Visit    1 year ago Medicare annual wellness visit, subsequent    [facility]    Office Visit    2 years ago Medicare annual wellness visit, subsequent    [facility]    Office Visit    3 years ago Essential hypertension    [facility] Pant[facility], Maelen, MD    Virtual Phone E/M    4 years ago Encounter for Medicare annual wellness exam    [facility], Maelen, MD    Office Visit                      Failed - EGFRCR or GFRNAA > 50     GFR Evaluation            Passed - In person appointment in the past 12 or next 3 months     Recent Outpatient Visits              6 months ago Medicare annual wellness visit, subsequent    [facility], Maelen, MD    Office Visit    1 year ago Medicare annual wellness visit, subsequent    [facility]    Office Visit    2 years ago Medicare annual wellness visit, subsequent     [facility], Maelen, MD    Office Visit    3 years ago Essential hypertension    [facility]    Virtual Phone E/M    4 years ago Encounter for Medicare annual wellness exam    [facility] Maelen, MD    Office Visit                               Recent Outpatient Visits              6 months ago Medicare annual wellness visit, subsequent    [facility]    [facility] Visit    1 year ago Medicare annual wellness visit, subsequent    [facility]    Office Visit    2 years ago Medicare annual wellness visit, subsequent    [facility]    Office Visit    3 years ago Essential hypertension    [facility]    Virtual Phone E/M    4 years ago Encounter for Medicare annual wellness exam    [facility]    Office Visit

## 2024-03-06 ENCOUNTER — HOSPITAL ENCOUNTER (EMERGENCY)
Facility: HOSPITAL | Age: 79
Discharge: LEFT BEFORE DISCHARGE | End: 2024-03-06
Payer: MEDICARE

## 2024-03-06 ENCOUNTER — HOSPITAL ENCOUNTER (OUTPATIENT)
Age: 79
Discharge: ANOTHER HOSPITAL | End: 2024-03-06
Payer: MEDICARE

## 2024-03-06 VITALS
HEART RATE: 101 BPM | OXYGEN SATURATION: 96 % | RESPIRATION RATE: 18 BRPM | DIASTOLIC BLOOD PRESSURE: 68 MMHG | TEMPERATURE: 97 F | SYSTOLIC BLOOD PRESSURE: 147 MMHG

## 2024-03-06 VITALS
OXYGEN SATURATION: 99 % | DIASTOLIC BLOOD PRESSURE: 65 MMHG | HEART RATE: 99 BPM | RESPIRATION RATE: 18 BRPM | SYSTOLIC BLOOD PRESSURE: 135 MMHG | TEMPERATURE: 97 F

## 2024-03-06 DIAGNOSIS — L81.9 DISCOLORATION OF SKIN OF TOE: Primary | ICD-10-CM

## 2024-03-06 DIAGNOSIS — M79.671 RIGHT FOOT PAIN: ICD-10-CM

## 2024-03-06 PROCEDURE — 99215 OFFICE O/P EST HI 40 MIN: CPT | Performed by: NURSE PRACTITIONER

## 2024-03-06 NOTE — ED PROVIDER NOTES
Patient Seen in: [facility]      History     Chief Complaint   Patient presents with    Foot Pain     Stated Complaint: right foot pain    Subjective:   Patient is a 78-year-old female with hypertension and history of breast cancer who presents today for right foot pain for 3 weeks.  Denies injury or trauma.  She reports the pain has gotten worse within the last week and has now noticed discoloration to the third and fifth toes.  She denies fever.  She does report burning in the foot that is generalized. No DM history,      Objective:   Past Medical History:   Diagnosis Date    Abnormal vaginal bleeding 1976    At high risk for breast cancer 5/24/2016    Breast cancer, left breast (HCC) 2010    lumpectomy and RT    Breast cancer, right (HCC) 1990    mastectomy and CMF    Hemorrhoids     History of breast cancer     History of lumpectomy     HTN (hypertension)     Musculoskeletal disorder     sciatic nerve problems    Osteoporosis screening 12/15/2010    Status post mastectomy 5/11/2016    Thrombocytosis 5/23/2016    Uncontrolled hypertension 5/11/2016              Past Surgical History:   Procedure Laterality Date    CHEMOTHERAPY  1990    right breast ca    HYSTERECTOMY  1979    States one ovary left in place.    LUMPECTOMY LEFT  2010    MASTECTOMY RIGHT  1990    RADIATION LEFT  2010                Social History     Socioeconomic History    Marital status: Single    Number of children: 3   Occupational History    Occupation: retired      Comment: office work    Tobacco Use    Smoking status: Every Day     Packs/day: 1.00     Years: 53.00     Additional pack years: 0.00     Total pack years: 53.00     Types: Cigarettes     Passive exposure: Current    Smokeless tobacco: Never   Substance and Sexual Activity    Alcohol use: Yes     Alcohol/week: 4.0 standard drinks of alcohol     Types: 4 Cans of beer per week     Comment: occasionally    Drug use: No   Other Topics Concern     Service No     Caffeine Concern Yes     Comment: Coffee 3 cups daily, Soda    Occupational Exposure No              Review of Systems   All other systems reviewed and are negative.      Positive for stated complaint: right foot pain  Other systems are as noted in HPI.  Constitutional and vital signs reviewed.      All other systems reviewed and negative except as noted above.    Physical Exam     ED Triage Vitals [03/06/24 1418]   /65   Pulse 99   Resp 18   Temp 97.4 °F (36.3 °C)   Temp src Temporal   SpO2 99 %   O2 Device None (Room air)       Current:/65   Pulse 99   Temp 97.4 °F (36.3 °C) (Temporal)   Resp 18   LMP  (LMP Unknown)   SpO2 99%         Physical Exam  Constitutional:       Appearance: Normal appearance.   Musculoskeletal:      Right foot: Swelling, tenderness (3rd and 5th toes) and bony tenderness present.   Skin:     Coloration: Skin is cyanotic (3rd and 5th toes, with blister formation to the right 5th toe).      Findings: Ecchymosis present.   Neurological:      Mental Status: She is alert.         ED Course   Labs Reviewed - No data to display    MDM     Medical Decision Making  Differentials include: Arterial insufficiency versus venous insufficiency versus cellulitis versus osteomyelitis versus other    Patient with moderate tenderness to the third and fifth toes, with what appears to be a blister forming in the right fifth toe.  Concerned about arterial insufficiency.  Will send to the ER for further evaluation.  Patient to go to [facility].  Patient agreeable.    Case discussed with Dr. Joao Carpenter        Disposition and Plan     Clinical Impression:  1. Discoloration of skin of toe    2. Right foot pain         Disposition:  Ic to ed  3/6/2024  3:07 pm    Follow-up:  No follow-up provider specified.        Medications Prescribed:  Discharge Medication List as of 3/6/2024  3:09 PM

## 2024-03-06 NOTE — ED INITIAL ASSESSMENT (HPI)
Patient presents to ED with burning/tingling to right foot x3 weeks. Per IC, patient has discoloration to 3rd toe. 3rd toe and pinkie appear red and swollen. Blister noted to bottom of pinkie toe

## 2024-03-06 NOTE — ED INITIAL ASSESSMENT (HPI)
Pt here with right foot pain, redness to side of foot and tingling and burning to 3rd and 5th toe. Pt states 3rd toe is also discolored. Pt denies any injury or trauma symptoms for at least 3 weeks maybe longer.

## 2024-03-07 ENCOUNTER — HOSPITAL ENCOUNTER (EMERGENCY)
Facility: HOSPITAL | Age: 79
Discharge: HOME | End: 2024-03-07
Attending: EMERGENCY MEDICINE
Payer: MEDICARE

## 2024-03-07 ENCOUNTER — APPOINTMENT (OUTPATIENT)
Dept: CT IMAGING | Facility: HOSPITAL | Age: 79
End: 2024-03-07
Attending: EMERGENCY MEDICINE
Payer: MEDICARE

## 2024-03-07 ENCOUNTER — TELEPHONE (OUTPATIENT)
Dept: INTERNAL MEDICINE CLINIC | Facility: CLINIC | Age: 79
End: 2024-03-07

## 2024-03-07 VITALS
BODY MASS INDEX: 21.53 KG/M2 | SYSTOLIC BLOOD PRESSURE: 148 MMHG | OXYGEN SATURATION: 96 % | TEMPERATURE: 98 F | HEIGHT: 66 IN | HEART RATE: 104 BPM | WEIGHT: 134 LBS | DIASTOLIC BLOOD PRESSURE: 78 MMHG | RESPIRATION RATE: 18 BRPM

## 2024-03-07 DIAGNOSIS — M79.671 ISCHEMIC PAIN OF RIGHT FOOT: ICD-10-CM

## 2024-03-07 DIAGNOSIS — M79.671 RIGHT FOOT PAIN: Primary | ICD-10-CM

## 2024-03-07 DIAGNOSIS — I99.8 ISCHEMIC PAIN OF RIGHT FOOT: ICD-10-CM

## 2024-03-07 LAB
ANION GAP SERPL CALC-SCNC: 6 MMOL/L (ref 0–18)
APTT PPP: 28.5 SECONDS (ref 23.3–35.6)
ATRIAL RATE: 84 BPM
BASOPHILS # BLD AUTO: 0.07 X10(3) UL (ref 0–0.2)
BASOPHILS NFR BLD AUTO: 0.7 %
BUN BLD-MCNC: 14 MG/DL (ref 9–23)
BUN/CREAT SERPL: 16.3 (ref 10–20)
CALCIUM BLD-MCNC: 10.1 MG/DL (ref 8.7–10.4)
CHLORIDE SERPL-SCNC: 107 MMOL/L (ref 98–112)
CO2 SERPL-SCNC: 23 MMOL/L (ref 21–32)
CREAT BLD-MCNC: 0.86 MG/DL
DEPRECATED RDW RBC AUTO: 43 FL (ref 35.1–46.3)
EGFRCR SERPLBLD CKD-EPI 2021: 70 ML/MIN/1.73M2 (ref 60–?)
EOSINOPHIL # BLD AUTO: 0.07 X10(3) UL (ref 0–0.7)
EOSINOPHIL NFR BLD AUTO: 0.7 %
ERYTHROCYTE [DISTWIDTH] IN BLOOD BY AUTOMATED COUNT: 12.6 % (ref 11–15)
GLUCOSE BLD-MCNC: 101 MG/DL (ref 70–99)
HCT VFR BLD AUTO: 45.8 %
HGB BLD-MCNC: 16.4 G/DL
IMM GRANULOCYTES # BLD AUTO: 0.07 X10(3) UL (ref 0–1)
IMM GRANULOCYTES NFR BLD: 0.7 %
INR BLD: 0.84 (ref 0.8–1.2)
LYMPHOCYTES # BLD AUTO: 1.9 X10(3) UL (ref 1–4)
LYMPHOCYTES NFR BLD AUTO: 19.9 %
MCH RBC QN AUTO: 33.2 PG (ref 26–34)
MCHC RBC AUTO-ENTMCNC: 35.8 G/DL (ref 31–37)
MCV RBC AUTO: 92.7 FL
MONOCYTES # BLD AUTO: 0.7 X10(3) UL (ref 0.1–1)
MONOCYTES NFR BLD AUTO: 7.3 %
NEUTROPHILS # BLD AUTO: 6.73 X10 (3) UL (ref 1.5–7.7)
NEUTROPHILS # BLD AUTO: 6.73 X10(3) UL (ref 1.5–7.7)
NEUTROPHILS NFR BLD AUTO: 70.7 %
OSMOLALITY SERPL CALC.SUM OF ELEC: 283 MOSM/KG (ref 275–295)
P AXIS: 56 DEGREES
P-R INTERVAL: 172 MS
PLATELET # BLD AUTO: 463 10(3)UL (ref 150–450)
POTASSIUM SERPL-SCNC: 3.7 MMOL/L (ref 3.5–5.1)
PROTHROMBIN TIME: 12.1 SECONDS (ref 11.6–14.8)
Q-T INTERVAL: 362 MS
QRS DURATION: 76 MS
QTC CALCULATION (BEZET): 427 MS
R AXIS: -4 DEGREES
RBC # BLD AUTO: 4.94 X10(6)UL
SODIUM SERPL-SCNC: 136 MMOL/L (ref 136–145)
T AXIS: 30 DEGREES
VENTRICULAR RATE: 84 BPM
WBC # BLD AUTO: 9.5 X10(3) UL (ref 4–11)

## 2024-03-07 PROCEDURE — 80048 BASIC METABOLIC PNL TOTAL CA: CPT | Performed by: EMERGENCY MEDICINE

## 2024-03-07 PROCEDURE — 75635 CT ANGIO ABDOMINAL ARTERIES: CPT | Performed by: EMERGENCY MEDICINE

## 2024-03-07 PROCEDURE — 85025 COMPLETE CBC W/AUTO DIFF WBC: CPT | Performed by: EMERGENCY MEDICINE

## 2024-03-07 PROCEDURE — 85610 PROTHROMBIN TIME: CPT | Performed by: EMERGENCY MEDICINE

## 2024-03-07 PROCEDURE — 36415 COLL VENOUS BLD VENIPUNCTURE: CPT

## 2024-03-07 PROCEDURE — 99284 EMERGENCY DEPT VISIT MOD MDM: CPT

## 2024-03-07 PROCEDURE — 99285 EMERGENCY DEPT VISIT HI MDM: CPT

## 2024-03-07 PROCEDURE — 93005 ELECTROCARDIOGRAM TRACING: CPT

## 2024-03-07 PROCEDURE — 85730 THROMBOPLASTIN TIME PARTIAL: CPT | Performed by: EMERGENCY MEDICINE

## 2024-03-07 RX ORDER — METOCLOPRAMIDE HYDROCHLORIDE 5 MG/ML
10 INJECTION INTRAMUSCULAR; INTRAVENOUS EVERY 8 HOURS PRN
OUTPATIENT
Start: 2024-03-07

## 2024-03-07 RX ORDER — CLOPIDOGREL BISULFATE 75 MG/1
75 TABLET ORAL DAILY
Qty: 90 TABLET | Refills: 1 | Status: SHIPPED | OUTPATIENT
Start: 2024-03-07 | End: 2024-09-03

## 2024-03-07 RX ORDER — HEPARIN SODIUM 5000 [USP'U]/ML
5000 INJECTION, SOLUTION INTRAVENOUS; SUBCUTANEOUS EVERY 12 HOURS SCHEDULED
OUTPATIENT
Start: 2024-03-07

## 2024-03-07 RX ORDER — ONDANSETRON 2 MG/ML
4 INJECTION INTRAMUSCULAR; INTRAVENOUS EVERY 6 HOURS PRN
OUTPATIENT
Start: 2024-03-07

## 2024-03-07 RX ORDER — TEMAZEPAM 15 MG/1
15 CAPSULE ORAL NIGHTLY PRN
OUTPATIENT
Start: 2024-03-07

## 2024-03-07 RX ORDER — CLOPIDOGREL BISULFATE 75 MG/1
300 TABLET ORAL ONCE
Status: COMPLETED | OUTPATIENT
Start: 2024-03-07 | End: 2024-03-07

## 2024-03-07 RX ORDER — MORPHINE SULFATE 2 MG/ML
1 INJECTION, SOLUTION INTRAMUSCULAR; INTRAVENOUS EVERY 2 HOUR PRN
OUTPATIENT
Start: 2024-03-07

## 2024-03-07 RX ORDER — ACETAMINOPHEN 500 MG
500 TABLET ORAL EVERY 4 HOURS PRN
OUTPATIENT
Start: 2024-03-07

## 2024-03-07 RX ORDER — MORPHINE SULFATE 2 MG/ML
2 INJECTION, SOLUTION INTRAMUSCULAR; INTRAVENOUS EVERY 2 HOUR PRN
OUTPATIENT
Start: 2024-03-07

## 2024-03-07 RX ORDER — ASPIRIN 81 MG/1
81 TABLET ORAL DAILY
OUTPATIENT
Start: 2024-03-07

## 2024-03-07 RX ORDER — CLOPIDOGREL BISULFATE 75 MG/1
300 TABLET ORAL ONCE
OUTPATIENT
Start: 2024-03-07 | End: 2024-03-07

## 2024-03-07 RX ORDER — MORPHINE SULFATE 4 MG/ML
4 INJECTION, SOLUTION INTRAMUSCULAR; INTRAVENOUS EVERY 2 HOUR PRN
OUTPATIENT
Start: 2024-03-07

## 2024-03-07 NOTE — ED PROVIDER NOTES
Signed out to follow-up results of CTA of the legs.  Patient appears to have blood flow to the dorsalis pedis but does have significant stenosis and calcifications in multiple vessels at this time proximally.      Patient was seen at bedside by Dr. Najjar and he would like patient to be discharged on Plavix.  He states that patient will follow-up with him this coming week to determine procedure as this is not acute at this time.    CTA ABDOMEN/PELVIS LOWER EXT BILAT W RUNOFF (RPL=[id number])    Result Date: 3/7/2024  CONCLUSION:   Extensive atherosclerotic calcification seen within the aortobiiliac system and throughout the bilateral lower extremities.  Complete occlusion of the proximal 10.3 cm of the right superficial femoral artery with retrograde filling.  Complete occlusion of majority of the left superficial femoral artery with retrograde filling.  Completely occluded bilateral posterior tibial arteries.  The bilateral profundus femoris arteries are patent.  Remainder of the bilateral calf vessels are diminutive but otherwise patent.  60% stenosis of the SMA without complete occlusion.  Complete atelectasis of the left lower lobe which is partially seen. If available, comparison with prior imaging is recommended to demonstrate stability. If none available, followup Non-emergent chest CT recommended to further evaluate and to exclude underlying endobronchial mass or mucous plug.  Multiple other incidental findings as described in the body of the report.      Dictated by (CST): Brennon Aguero MD on 3/07/2024 at 4:02 PM     Finalized by (CST): Brennon Aguero MD on 3/07/2024 at 4:16 PM

## 2024-03-07 NOTE — ED QUICK NOTES
Orders for admission, patient is aware of plan and ready to go upstairs. Any questions, please call ED RN ivette at extension [phone].     Patient Covid vaccination status: Partially vaccinated     COVID Test Ordered in ED: None    COVID Suspicion at Admission: N/A    Running Infusions:  None    Mental Status/LOC at time of transport: aox4    Other pertinent information:   CIWA score: N/A   NIH score:  N/A

## 2024-03-07 NOTE — TELEPHONE ENCOUNTER
Patient calling this morning, she was seen in IC yesterday and sent to ER for symptoms.     Calling today asking if she can just get orders with radiology to look into this. Advised that she needs evaluation in ER per recommendations yesterday - patient agreeable and will go to [facility] today for care.     FYI to Dr. Simons since she did not follow plan of care recommended yesterday.     Per notes yesterday IC -     Medical Decision Making  Differentials include: Arterial insufficiency versus venous insufficiency versus cellulitis versus osteomyelitis versus other     Patient with moderate tenderness to the third and fifth toes, with what appears to be a blister forming in the right fifth toe.  Concerned about arterial insufficiency.  Will send to the ER for further evaluation.  Patient to go to [place] [facility].  Patient agreeable.     Case discussed with Dr. Joao Carpenter

## 2024-03-07 NOTE — ED PROVIDER NOTES
Patient Seen in: [facility] Emergency Department      History     Chief Complaint   Patient presents with    Foot Pain     Stated Complaint: Foot Problem    Subjective:   HPI    Patient is a 78-year-old female who was seen yesterday in immediate care for right foot pain that is been present for about 3 weeks there is no injury or trauma.  States the pain is worse in the last week she noticed some discoloration to her third and fifth toe.  She complains of a burning pain.  There is some paresthesias no numbness.  No open wound    Objective:   Past Medical History:   Diagnosis Date    Abnormal vaginal bleeding 1976    At high risk for breast cancer 5/24/2016    Breast cancer, left breast (HCC) 2010    lumpectomy and RT    Breast cancer, right (HCC) 1990    mastectomy and CMF    Hemorrhoids     History of breast cancer     History of lumpectomy     HTN (hypertension)     Musculoskeletal disorder     sciatic nerve problems    Osteoporosis screening 12/15/2010    Status post mastectomy 5/11/2016    Thrombocytosis 5/23/2016    Uncontrolled hypertension 5/11/2016              Past Surgical History:   Procedure Laterality Date    CHEMOTHERAPY  1990    right breast ca    HYSTERECTOMY  1979    States one ovary left in place.    LUMPECTOMY LEFT  2010    MASTECTOMY RIGHT  1990    RADIATION LEFT  2010                Social History     Socioeconomic History    Marital status: Single    Number of children: 3   Occupational History    Occupation: retired      Comment: office work    Tobacco Use    Smoking status: Every Day     Packs/day: 1.00     Years: 53.00     Additional pack years: 0.00     Total pack years: 53.00     Types: Cigarettes     Passive exposure: Current    Smokeless tobacco: Never   Substance and Sexual Activity    Alcohol use: Yes     Alcohol/week: 4.0 standard drinks of alcohol     Types: 4 Cans of beer per week     Comment: occasionally    Drug use: No   Other Topics Concern     Service No     Caffeine Concern Yes     Comment: Coffee 3 cups daily, Soda    Occupational Exposure No              Review of Systems    Positive for stated complaint: Foot Problem  Other systems are as noted in HPI.  Constitutional and vital signs reviewed.      All other systems reviewed and negative except as noted above.    Physical Exam     ED Triage Vitals [03/07/24 1218]   /64   Pulse 101   Resp 18   Temp 97.6 °F (36.4 °C)   Temp src Temporal   SpO2 96 %   O2 Device None (Room air)       Current:BP (!) 162/77   Pulse 98   Temp 97.6 °F (36.4 °C) (Temporal)   Resp 16   Ht 167.6 cm (5' 6\")   Wt 60.8 kg   LMP  (LMP Unknown)   SpO2 97%   BMI 21.63 kg/m²         Physical Exam    Patient awake alert focused exam on her right lower extremity      There is no swelling.  The foot is warm.  There is purplish discoloration of both her third and fifth toes.  I am able to get a dorsalis pedis pulse with the Doppler I am unable to get a posterior tibial pulse.  Sensation is intact.    ED Course     Labs Reviewed   BASIC METABOLIC PANEL (8) - Abnormal; Notable for the following components:       Result Value    Glucose 101 (*)     All other components within normal limits   CBC W/ DIFFERENTIAL - Abnormal; Notable for the following components:    HGB 16.4 (*)     .0 (*)     All other components within normal limits   PROTHROMBIN TIME (PT) - Normal   PTT, ACTIVATED - Normal   CBC WITH DIFFERENTIAL WITH PLATELET    Narrative:     The following orders were created for panel order CBC With Differential With Platelet.  Procedure                               Abnormality         Status                     ---------                               -----------         ------                     CBC W/ DIFFERENTIAL[[id number]]          Abnormal            Final result                 Please view results for these tests on the individual orders.          EKG:  axis intervals as listed on the chart rate is 84 rhythm is sinus  interpretation no acute ST-T wave change.             MDM      Use of independent historian: Patient's daughter helps with history    I personally reviewed and interpreted the images :     No results found.    Vitals:    03/07/24 1218 03/07/24 1342   BP: 137/64 (!) 162/77   Pulse: 101 98   Resp: 18 16   Temp: 97.6 °F (36.4 °C)    TempSrc: Temporal    SpO2: 96% 97%   Weight: 60.8 kg    Height: 167.6 cm (5' 6\")      *I personally reviewed and interpreted all ED vitals.    Pulse Ox: 96%, Room air, Normal     EKG interpretation above independently interpreted by me    Monitor Interpretation:   normal sinus rhythm independently interpreted by me    Differential Diagnosis/ Diagnostic Considerations: Patient with painful foot no trauma.  Discoloration of third and fifth toes consider ischemic changes.  Doubt trauma.    Medical Record Review: I personally reviewed available prior medical records for any recent pertinent discharge summaries, testing, and procedures and reviewed those reports and found immediate care visit from yesterday notes reviewed..    Complicating Factors: The patient already has hypertension thrombocytosis which contribute to the complexity of this ED evaluation.    Social determinants of health:    Prescription drug management:      Shared Decision Making:    ED Course: Patient remained stable.  Seen in the ED by Dr. Burns who request CTA with runoff.  Vascular surgery consulted    Discussion of management with other healthcare providers:    Condition upon leaving the department: Stable                                     Medical Decision Making      Disposition and Plan     Clinical Impression:  1. Right foot pain         Disposition:  There is no disposition on file for this visit.  There is no disposition time on file for this visit.    Follow-up:  No follow-up provider specified.  We recommend that you schedule follow up care with a primary care provider within the next three months to obtain  basic health screening including reassessment of your blood pressure.      Medications Prescribed:  Current Discharge Medication List

## 2024-03-07 NOTE — CONSULTS
Samer F. Najjar, MD.  Vascular Surgery  [facility]       VASCULAR SURGERY   ER CONSULT NOTE      Name: Emily Rice   :   9/3/1945  [id number]     Date of Consultation: 3/7/2024       REFERRING PHYSICIAN: Jose J García MD  PRIMARY CARE PHYSICIAN:  Kimberly Simons MD    HISTORY OF PRESENT ILLNESS:   Patient is a 78 year old female whom I have been asked to see regarding right foot ischemia affecting mainly the third toe.  The patient is a longtime smoker who presented to the hospital with pain affecting her right third toe mainly and the lateral fifth toe area.  She initially went to immediate care who then referred her to the ER noted to obtain arterial testing.  The patient then underwent a CT angiogram of the abdomen pelvis and lower extremities that revealed significant atherosclerotic calcification affecting both iliac arteries with 50% stenosis of the right common femoral artery and occlusion of the superficial femoral artery proximally with retrograde filling distally.  There was also severe stenosis significant atherosclerotic changes affecting the tibial vessels with occlusion of the posterior tibial artery.  She had similar findings on the opposite side with complete occlusion of the superficial femoral artery.  The patient states that she is able to walk on a daily basis with no pain affecting her left calf but now she is having pain affecting the right l calf area and the foot area which is what brought her to the emergency room in the first place.    PAST MEDICAL HISTORY:    Past Medical History:   Diagnosis Date    Abnormal vaginal bleeding     At high risk for breast cancer 2016    Breast cancer, left breast (HCC)     lumpectomy and RT    Breast cancer, right (HCC)     mastectomy and CMF    Hemorrhoids     History of breast cancer     History of lumpectomy     HTN (hypertension)     Musculoskeletal disorder     sciatic nerve problems    Osteoporosis screening  12/15/2010    Status post mastectomy 5/11/2016    Thrombocytosis 5/23/2016    Uncontrolled hypertension 5/11/2016       PAST SURGICAL HISTORY:   Past Surgical History:   Procedure Laterality Date    CHEMOTHERAPY  1990    right breast ca    HYSTERECTOMY  1979    States one ovary left in place.    LUMPECTOMY LEFT  2010    MASTECTOMY RIGHT  1990    RADIATION LEFT  2010       MEDICATIONS:   No current facility-administered medications for this encounter.    ALLERGIES:    She has No Known Allergies.    SOCIAL HISTORY:    Patient  reports that she has been smoking cigarettes. She has a 53 pack-year smoking history. She has been exposed to tobacco smoke. She has never used smokeless tobacco. She reports current alcohol use of about 4.0 standard drinks of alcohol per week. She reports that she does not use drugs.    FAMILY HISTORY:    Patient's family history includes Breast Cancer in her self; Heart Disease in her mother; Other in her father; alive and well in her daughter; atrial fib in her sister; bowel resection in her sister.    ROS:    Comprehensive ROS reviewed and negative except for what's stated above.  Including negative for chest pain, shortness of breath, syncope.     EXAM:  /78   Pulse 104   Temp 97.6 °F (36.4 °C) (Temporal)   Resp 18   Ht 5' 6\" (1.676 m)   Wt 134 lb (60.8 kg)   LMP  (LMP Unknown)   SpO2 96%   BMI 21.63 kg/m²   GENERAL: alert and orientated X 3, well developed, well nourished, in no apparent distress  NEURO/PSYCH: normal mood and affect  NECK: supple   CAROTID: No bruits  RESPIRATORY: no rales, rhonchi, or wheezes B  CARDIO: RRR without murmur, no murmur, no gallop   ABDOMEN: soft, non-tender with no palpable aneurysm or masses  EXTREMITIES: no tenderness  VASCULAR:        Femoral Popliteal DP PT Peroneal Edema   Right 1+       monophasic signal absent signal       none   Left 1+       monophasic signal absent signal       none      The right third toe appears to have mild  ischemia but is tender.  There is some cyanotic discoloration at the tip.  no sensory or motor deficits      Diagnostic Data:      LABS:  Recent Labs   Lab 03/07/24  1341   WBC 9.5   HGB 16.4*   MCV 92.7   .0*   INR 0.84       Recent Labs   Lab 03/07/24  1341      K 3.7      CO2 23.0   BUN 14   CREATSERUM 0.86   *   CA 10.1     Recent Labs   Lab 03/07/24  1341   PTP 12.1   INR 0.84   PTT 28.5     No results for input(s): \"ALT\", \"AST\", \"ALB\", \"AMYLASE\", \"LIPASE\", \"LDH\" in the last 168 hours.    Invalid input(s): \"ALPHOS\", \"TBIL\", \"DBIL\", \"TPROT\"  No results for input(s): \"TROP\" in the last 168 hours.  No results found for: \"ANAS\", \"DANE\", \"ANASCRN\"  No results for input(s): \"PCACT\", \"PSACT\", \"AT3ACT\", \"HIPAB\", \"PATHI\", \"STALA\", \"DRVVTRATIO\", \"DRVVT\", \"STACLOT\", \"CARIGG\", \"T5MZ9UIPNW\", \"C2LO4WXCFK\", \"RA\", \"HAVIGM\", \"HBCIGM\", \"HCVAB\", \"HBSAG\", \"HBCAB\", \"HBVDNAINTERP\", \"ANAS\", \"C3\", \"C4\" in the last 168 hours.    Lab Results   Component Value Date    HGB 16.4 (H) 03/07/2024    HGB 15.1 05/18/2022    HGB 14.7 05/07/2021    HGB 15.2 08/14/2019    HGB 15.4 06/11/2018    HGB 15.5 12/07/2016    CREATSERUM 0.86 03/07/2024    CREATSERUM 0.69 05/18/2022    CREATSERUM 0.64 05/07/2021    CREATSERUM 0.65 08/14/2019    CREATSERUM 0.66 06/11/2018    CREATSERUM 0.64 12/07/2016           Radiology: CTA ABDOMEN/PELVIS LOWER EXT BILAT W RUNOFF (EPL=43692)    Result Date: 3/7/2024  PROCEDURE: CTA ABDOMEN/PELVIS LOWER EXT BILAT W RUNOFF (CPT=75635)  COMPARISON: None.  INDICATIONS: Foot Problem  TECHNIQUE: CT images of the abdomen, pelvis, and lower extremities were obtained without and with non-ionic intravenous contrast material. Multi-planar reformatted and 3-D images were created with vessel analysis performed on an independent workstation.  Automated exposure control for dose reduction was used. Adjustment of the mA and/or kV was done based on the patient's size. Use of iterative reconstruction technique for  dose reduction was used.  Dose information is transmitted to the ACR (American College of Radiology) NRDR (National Radiology Data Registry) which includes the Dose Index Registry.  FINDINGS:  ABDOMINAL AORTA: There is atherosclerotic calcification of the abdominal aorta extending into bilateral iliac arteries.  Moderate grade calcification is also seen involving the origins of the celiac axis and SMA.  There is approximately 60% narrowing seen involving the mid aspect of the SMA without complete occlusion.  The JUSTYN is seen and is patent.  There is atherosclerotic calcification seen at the origin of the bilateral renal arteries which are otherwise patent.  No focal vascular cut off, aneurysmal dilation or high-grade stenosis. RIGHT LEG   AORTO-ILIAC: There is moderate atherosclerotic calcification of the common iliac artery extending into the right internal and external iliac arteries. Moderate grade stenosis of the internal and external iliac arteries without occlusion.  FEMORAL ARTERY:   Moderate atherosclerotic calcification and noncalcified atheromatous plaques throughout the common femoral artery as well as within the superficial femoral artery throughout its entire course.  Approximately 50% narrowing seen involving the distal common femoral artery without complete occlusion.  The proximal superficial femoral artery is completely occluded for the proximal 10.3 cm.  There is retrograde filling of the mid and distal superficial femoral artery from intramuscular branches.  The remainder of the superficial femoral artery has multifocal areas of high-grade stenosis would atheromatous plaque and atheromatous calcification.  The profundus femoris artery is patent.  POPLITEAL ARTERY:   Moderate grade atherosclerotic calcification and noncalcified atheromatous plaque seen throughout the origin of the popliteal artery. No focal occlusion or aneurysmal dilation.  RUN-OFF ARTERIES:   There is extensive atherosclerotic  calcification at the popliteal bifurcation. There is high-grade atherosclerotic calcification of the anterior tibial artery and tibioperoneal trunk. There is otherwise normal branching of the anterior tibial, posterior tibial and peroneal arteries. The posterior tibial artery is occluded for nearly its entire course without retrograde filling.  Diminutive appearance with a thready appearance of the peroneal artery and anterior tibial artery which otherwise remain patent.  The dorsalis pedis artery is patent. LEFT LEG   AORTO-ILIAC: There is severe atherosclerotic calcification of the common iliac artery extending into the left internal and external iliac arteries. Moderate grade stenosis of the internal and external iliac arteries without occlusion.  FEMORAL ARTERY:   Moderate atherosclerotic calcification and noncalcified atheromatous plaques throughout the common femoral artery as well as within the superficial femoral artery throughout its entire course.  Approximately 50% narrowing seen involving the distal common femoral artery without complete occlusion.  The proximal and mid superficial femoral artery is completely occluded for approximately 24 cm stretch spanning the proximal and mid superficial femoral arteries.  There is retrograde filling of the distal superficial femoral artery from intramuscular branches at the level of the adductor canal.  The profundus femoris artery is patent.  POPLITEAL ARTERY:   Moderate grade atherosclerotic calcification and noncalcified atheromatous plaque seen throughout the origin of the popliteal artery. No focal occlusion or aneurysmal dilation.  RUN-OFF ARTERIES: There is extensive atherosclerotic calcification at the popliteal bifurcation. There is high-grade atherosclerotic calcification of the anterior tibial artery and tibioperoneal trunk. There is otherwise normal branching of the anterior  tibial, posterior tibial and peroneal arteries. The posterior tibial artery is  occluded for nearly its entire course without retrograde filling.  Diminutive appearance with a thready appearance of the peroneal artery and anterior tibial artery which otherwise remain patent.  The dorsalis pedis artery is patent.   LIVER: Unremarkable without focal mass. GALLBLADDER: Normal wall thickness.  No pericholecystic fluid. BILIARY: No intra-or extrahepatic biliary ductal dilation. SPLEEN: Unremarkable PANCREAS: The pancreas enhances symmetrically. No ductal dilation. ADRENALS: Unremarkable KIDNEYS: The kidneys enhance symmetrically. There is no hydronephrosis.  RETROPERITONEUM: No mass or enlarged adenopathy.  BOWEL:  There is diverticulosis involving the distal descending colon and sigmoid colon without evidence of diverticulitis. There is no dilation or wall thickening. The appendix is normal. There are no inflammatory changes within the right lower quadrant.  Small hiatal hernia with wall thickening at the gastroesophageal junction. MESENTERY: Normal.  No mass or hernia.   PELVIS: No enlarged mass or adenopathy. No bladder wall thickening. BONES:   There is degenerative disease of the thoracic and lumbar spine. Degenerative changes are seen within the sacroiliac joints and pubic symphysis. Degenerative changes are seen in the bilateral hips. There is a chronic compression deformity of the L3 vertebral body with less than 25% loss of vertebral body height.  Degenerative changes are also seen within the bilateral knees, ankle and feet.  There is a benign-appearing enchondroma within the left proximal tibia.  LUNG BASES: There is complete atelectasis of the left lower lobe with volume loss within the left hemithorax.         CONCLUSION:   Extensive atherosclerotic calcification seen within the aortobiiliac system and throughout the bilateral lower extremities.  Complete occlusion of the proximal 10.3 cm of the right superficial femoral artery with retrograde filling.  Complete occlusion of majority of  the left superficial femoral artery with retrograde filling.  Completely occluded bilateral posterior tibial arteries.  The bilateral profundus femoris arteries are patent.  Remainder of the bilateral calf vessels are diminutive but otherwise patent.  60% stenosis of the SMA without complete occlusion.  Complete atelectasis of the left lower lobe which is partially seen. If available, comparison with prior imaging is recommended to demonstrate stability. If none available, followup Non-emergent chest CT recommended to further evaluate and to exclude underlying endobronchial mass or mucous plug.  Multiple other incidental findings as described in the body of the report.      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  The patient does not want to be admitted and I think it is safe for her to be treated as an outpatient.  I have recommended that she start on Plavix regimen.  I have asked her to call my office and make an appointment so that she would be seen in 1 week to determine if she is improving or she may require angioplasty and stenting depending on the level of pain in her foot and how it progresses.  Her daughter was present and we clearly discussed the importance of see me sooner than that should the foot worsen.  The patient indicated an understanding of these issues and agreed to the plan and all questions were answered.     Thank you for allowing to participate in the patient's care.       Samer F. Najjar, MD  Vascular Surgery  [facility]

## 2024-03-07 NOTE — ED INITIAL ASSESSMENT (HPI)
Patient arrives ambulatory through triage with complaints of right foot pain, worsening. Sent to ER by primary care.

## 2024-03-07 NOTE — H&P
[facility]    PATIENT'S NAME: ALEXANDRA GARDNER   ATTENDING PHYSICIAN: Jose J García MD   PATIENT ACCOUNT#:   [id number]    LOCATION:  [facility] [address] [facility] [address]  MEDICAL RECORD #:   [id number]       YOB: 1945  ADMISSION DATE:       03/07/2024    HISTORY AND PHYSICAL EXAMINATION    CHIEF COMPLAINT:  Ischemic right foot.    HISTORY OF PRESENT ILLNESS:  The patient is a 78-year-old  female who has been having pain and pinkish discoloration of her right third and fifth toes for the last 4 weeks.  Symptoms have been progressive for the last 2 to 3 days.  She has been having pain when she walks in the area.  Also, she has been experiencing pain in her right calf area with physical activity.  No opened wounds.  She was seen at the urgent care center and discharged home.  Today came to the emergency department for evaluation.  CBC and chemistry were unremarkable.  EKG showed normal sinus rhythm.  CT angiogram of the abdomen and pelvis with runoff to both lower extremities still pending.    PAST MEDICAL HISTORY:  Essential hypertension with chronic thrombocytosis.  Platelet count has been ranging from 400 to 600.    PAST SURGICAL HISTORY:  Left breast lumpectomy plus radiation therapy for breast cancer, right breast mastectomy for breast cancer, hysterectomy.    MEDICATIONS:  Please see medication reconciliation list.    ALLERGIES:  No known drug allergies.    FAMILY HISTORY:  Mother had heart disease.  Father had liver cirrhosis.    SOCIAL HISTORY:  Chronic tobacco use, smokes around 1 pack a day.  Social alcohol.  No drug use.  Lives with her family.  Independent in her basic activities of daily living.    REVIEW OF SYSTEMS:  The patient reports pain and pinkish discoloration to darkish discoloration of the right third and fifth toes.  Pain gets worse when she ambulates.  She also has been experiencing pain in her right calf area with physical activity.  Symptoms have been of progressive nature for  the last 3 to 4 weeks.  In last 2 to 3 days, it has become more intense.  Denies any recent trauma.      PHYSICAL EXAMINATION:    GENERAL:  Alert.  Oriented to time, place, and person.  Mild distress.  VITAL SIGNS:  Temperature 97.6, pulse 98, respiratory rate 16, blood pressure 162/77, pulse ox 97% on room air.  HEENT:  Atraumatic.  Oropharynx clear, moist mucous membranes.  Normal hard and soft palate.  Eyes:  Anicteric sclerae.  NECK:  Supple.  No lymphadenopathy.  Trachea midline.  Full range of motion.  LUNGS:  Clear to auscultation bilaterally.  Normal respiratory effort.  HEART:  Regular rate and rhythm.  S1, S2 auscultated.  No murmur.  ABDOMEN:  Soft, nondistended, no tenderness.  Positive bowel sounds.  EXTREMITIES:  No edema, clubbing, or cyanosis.  Right third and fifth toes with pink hue discoloration.  Tip of the right third toe is slightly darkened in color.  No opened wounds.  No active gangrenous changes.  Dorsalis pedis could not be palpated manually on the right foot dorsum.  Also, popliteal artery could not be palpated.  Otherwise, foot is relatively warm to touch.  The foot is very sensitive to touch and palpation because of tingling.    ASSESSMENT:    1.   Ischemic changes right foot involving the right third and fifth toes with possible vascular claudication.  2.   Essential hypertension.  3.   Mild thrombocytosis.    PLAN:  The patient will be admitted to general medical floor.  Follow up on CT angiogram of the abdomen and pelvis with runoff to bilateral lower extremities, start her on aspirin, obtain JAK2 mutation testing to evaluate for essential thrombocythemia, obtain vascular surgery consult, pain control.  Further recommendations to follow.    Dictated By America Burns MD  d: 03/07/2024 15:08:27  t: 03/07/2024 15:11:08  Job [id number]  FB/

## 2024-03-12 ENCOUNTER — PATIENT OUTREACH (OUTPATIENT)
Dept: CASE MANAGEMENT | Age: 79
End: 2024-03-12

## 2024-03-12 NOTE — PROGRESS NOTES
1st attempt ER f/up apt request  No answer, LVMTCB to schedule apt  PC[facility] -unable to contact  [facility]  Closing encounter

## 2024-03-14 ENCOUNTER — OFFICE VISIT (OUTPATIENT)
Facility: CLINIC | Age: 79
End: 2024-03-14
Payer: MEDICARE

## 2024-03-14 VITALS — BODY MASS INDEX: 21.53 KG/M2 | HEIGHT: 66 IN | WEIGHT: 134 LBS | RESPIRATION RATE: 18 BRPM

## 2024-03-14 DIAGNOSIS — I77.9 CAROTID ARTERY DISEASE WITHOUT CEREBRAL INFARCTION (HCC): Primary | ICD-10-CM

## 2024-03-14 DIAGNOSIS — I65.29 STENOSIS OF CAROTID ARTERY, UNSPECIFIED LATERALITY: ICD-10-CM

## 2024-03-26 NOTE — PROGRESS NOTES
Samer F. Najjar, MD  Vascular Surgery  [facility]       VASCULAR SURGERY OFFICE NOTE        Name: Emily Rice   : 9/3/1945  [id number]     REASON FOR VISIT:   Patient is a 78 year old female who is here for fu after being seen in the hospital last week. She had developed a right third toe ischemia (blue toe).  At the ER, she underwent a CT angiogram of the abdomen pelvis and lower extremities that revealed significant atherosclerotic calcification affecting both iliac arteries with 50% stenosis of the right common femoral artery and occlusion of the superficial femoral artery proximally with retrograde filling distally.  There was also severe stenosis significant atherosclerotic changes affecting the tibial vessels with occlusion of the posterior tibial artery. 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 She was started on Plavix and she sates that the pain has completely abated. She denies any claudication symptoms.     Past Medical History:   Diagnosis Date    Abnormal vaginal bleeding     At high risk for breast cancer 2016    Breast cancer, left breast (HCC)     lumpectomy and RT    Breast cancer, right (HCC)     mastectomy and CMF    Hemorrhoids     History of breast cancer     History of lumpectomy     HTN (hypertension)     Musculoskeletal disorder     sciatic nerve problems    Osteoporosis screening 12/15/2010    Status post mastectomy 2016    Thrombocytosis 2016    Uncontrolled hypertension 2016       PAST SURGICAL HISTORY:     Past Surgical History:   Procedure Laterality Date    CHEMOTHERAPY      right breast ca    HYSTERECTOMY      States one ovary left in place.    LUMPECTOMY LEFT      MASTECTOMY RIGHT  1990    RADIATION LEFT          MEDICATIONS:     Current Outpatient Medications:     clopidogrel 75 MG  Oral Tab, Take 1 tablet (75 mg total) by mouth daily., Disp: 90 tablet, Rfl: 1    amLODIPine 10 MG Oral Tab, Take 1 tablet (10 mg total) by mouth daily., Disp: 90 tablet, Rfl: 1    losartan 100 MG Oral Tab, Take 1 tablet (100 mg total) by mouth daily., Disp: 90 tablet, Rfl: 1    cholecalciferol 50 MCG (2000 UT) Oral Cap, Take 1 capsule (2,000 Units total) by mouth daily., Disp: , Rfl:     LABSS:     No results found for: \"EAG\", \"A1C\"   Lab Results   Component Value Date     (H) 03/07/2024    BUN 14 03/07/2024    CREATSERUM 0.86 03/07/2024    BUNCREA 16.3 03/07/2024    ANIONGAP 6 03/07/2024    GFRAA 98 05/18/2022    GFRNAA 85 05/18/2022    CA 10.1 03/07/2024     03/07/2024    K 3.7 03/07/2024     03/07/2024    CO2 23.0 03/07/2024    OSMOCALC 283 03/07/2024      Lab Results   Component Value Date    WBC 9.5 03/07/2024    RBC 4.94 03/07/2024    HGB 16.4 (H) 03/07/2024    HCT 45.8 03/07/2024    MCV 92.7 03/07/2024    MCH 33.2 03/07/2024    MCHC 35.8 03/07/2024    RDW 12.6 03/07/2024    .0 (H) 03/07/2024    MPV 8.9 06/11/2018        Lab Results   Component Value Date    HGB 16.4 (H) 03/07/2024    HGB 15.1 05/18/2022    HGB 14.7 05/07/2021    HGB 15.2 08/14/2019    HGB 15.4 06/11/2018    HGB 15.5 12/07/2016    CREATSERUM 0.86 03/07/2024    CREATSERUM 0.69 05/18/2022    CREATSERUM 0.64 05/07/2021    CREATSERUM 0.65 08/14/2019    CREATSERUM 0.66 06/11/2018    CREATSERUM 0.64 12/07/2016       EXAM:   Resp 18   Ht 5' 6\" (1.676 m)   Wt 134 lb (60.8 kg)   LMP  (LMP Unknown)   BMI 21.63 kg/m²     Both feet are warm and well perfused. The left third toe cyanotic changes have improved.     ASSESSMENT    Diagnoses and all orders for this visit:    Carotid artery disease without cerebral infarction (HCC)  -     US CAROTID DOPPLER BILAT - DIAG IMG (CPT=93880); Future    Stenosis of carotid artery, unspecified laterality  -     US CAROTID DOPPLER BILAT - DIAG IMG (CPT=93880); Future    The patient has  developed a thromboembolic event affecting her left third toe after she developed occlusion of her left superficial femoral artery that was acute on chronic    She was managed as actively with clopidogrel and now she is back to her baseline.  Even though both of her superficial femoral arteries are occluded, she denies any claudication symptoms and she actually walks on a daily basis.  We discussed the importance of smoking cessation.  Given her heavy atherosclerotic load, I recommended obtaining a carotid duplex to serve as a baseline.  There is no need for repeat imaging of her lower extremities, unless new symptoms arise      PLAN:     As above        Samer F. Najjar MD  Division of Vascular Surgery

## 2024-04-03 ENCOUNTER — HOSPITAL ENCOUNTER (OUTPATIENT)
Dept: ULTRASOUND IMAGING | Facility: HOSPITAL | Age: 79
Discharge: HOME | End: 2024-04-03
Attending: SURGERY
Payer: MEDICARE

## 2024-04-03 DIAGNOSIS — I77.9 CAROTID ARTERY DISEASE WITHOUT CEREBRAL INFARCTION (HCC): ICD-10-CM

## 2024-04-03 DIAGNOSIS — I65.29 STENOSIS OF CAROTID ARTERY, UNSPECIFIED LATERALITY: ICD-10-CM

## 2024-04-03 PROCEDURE — 93880 EXTRACRANIAL BILAT STUDY: CPT | Performed by: SURGERY

## 2024-05-07 ENCOUNTER — OFFICE VISIT (OUTPATIENT)
Dept: INTERNAL MEDICINE CLINIC | Facility: CLINIC | Age: 79
End: 2024-05-07
Payer: MEDICARE

## 2024-05-07 VITALS
SYSTOLIC BLOOD PRESSURE: 136 MMHG | WEIGHT: 138 LBS | BODY MASS INDEX: 22.18 KG/M2 | HEART RATE: 114 BPM | HEIGHT: 66 IN | DIASTOLIC BLOOD PRESSURE: 71 MMHG

## 2024-05-07 DIAGNOSIS — I65.23 ATHEROSCLEROSIS OF BOTH CAROTID ARTERIES: Primary | ICD-10-CM

## 2024-05-07 DIAGNOSIS — L97.419: ICD-10-CM

## 2024-05-07 DIAGNOSIS — I70.209 ATHEROSCLEROSIS OF ARTERIES OF EXTREMITIES (HCC): ICD-10-CM

## 2024-05-07 DIAGNOSIS — I83.014: ICD-10-CM

## 2024-05-07 DIAGNOSIS — B35.1 ONYCHOMYCOSIS: ICD-10-CM

## 2024-05-07 PROCEDURE — 99214 OFFICE O/P EST MOD 30 MIN: CPT | Performed by: STUDENT IN AN ORGANIZED HEALTH CARE EDUCATION/TRAINING PROGRAM

## 2024-05-07 RX ORDER — ATORVASTATIN CALCIUM 40 MG/1
40 TABLET, FILM COATED ORAL NIGHTLY
Qty: 90 TABLET | Refills: 3 | Status: SHIPPED | OUTPATIENT
Start: 2024-05-07 | End: 2025-05-02

## 2024-05-07 RX ORDER — AMMONIUM LACTATE 12 G/100G
1 CREAM TOPICAL 2 TIMES DAILY
Qty: 385 G | Refills: 3 | Status: SHIPPED | OUTPATIENT
Start: 2024-05-07 | End: 2025-05-02

## 2024-05-07 NOTE — PROGRESS NOTES
OFFICE NOTE     Patient ID: Emily Rice is a 78 year old female.  Today's Date: 05/07/24  Chief Complaint: Foot Pain (R heel pain, dry skin )    Pt is a 79y/o female with Pmhx of HLD, history of right breast cancer, CAD Severe carotid and LE atheroscloeric disease (recent right thrid toes ischemia s/p CTA  (seen by Dr. Najjar), current smoker (53 pack year history)The patient's ASCVD 10 year risk score is 43.6. whom presents to clinic with right heel pain. Patient saw vascular surgery a couple months ago for right foot critical limb ischemia and carotid artery disease, here with right heel pain for past few weeks. Has noticed it isgetting tender and difficult to walk much due to heel pain. Patient initially thought it was from new sketchers but notieced is not having left heel pain and noticed skin breakdown    Heel Pain  This is a chronic problem. The current episode started 1 to 4 weeks ago. The problem has been gradually worsening. Pertinent negatives include no abdominal pain, anorexia, arthralgias, change in bowel habit, chest pain, chills, congestion, coughing, fatigue, fever, headaches, joint swelling, myalgias, nausea, neck pain, numbness, rash, sore throat, swollen glands, urinary symptoms, vertigo, visual change, vomiting or weakness.       The patient's ASCVD 10 year risk score is 43.6.          Vitals:    05/07/24 1007   BP: 136/71   Pulse: 114   Weight: 138 lb (62.6 kg)   Height: 5' 6\" (1.676 m)     body mass index is 22.27 kg/m².  BP Readings from Last 3 Encounters:   05/07/24 136/71   03/07/24 148/78   03/06/24 135/65     The 10-year ASCVD risk score (Sherri MCKAY, et al., 2019) is: 38.8%    Values used to calculate the score:      Age: 78 years      Sex: Female      Is Non- : No      Diabetic: No      Tobacco smoker: Yes      Systolic Blood Pressure: 136 mmHg      Is BP treated: Yes      HDL Cholesterol: 79 mg/dL      Total Cholesterol: 232  mg/dL      Medications reviewed:  Current Outpatient Medications   Medication Sig Dispense Refill    atorvastatin 40 MG Oral Tab Take 1 tablet (40 mg total) by mouth nightly. 90 tablet 3    Ammonium Lactate 12 % External Cream Apply 1 Application topically 2 (two) times daily. 385 g 3    clopidogrel 75 MG Oral Tab Take 1 tablet (75 mg total) by mouth daily. 90 tablet 1    amLODIPine 10 MG Oral Tab Take 1 tablet (10 mg total) by mouth daily. 90 tablet 1    losartan 100 MG Oral Tab Take 1 tablet (100 mg total) by mouth daily. 90 tablet 1    cholecalciferol 50 MCG (2000 UT) Oral Cap Take 1 capsule (2,000 Units total) by mouth daily.           Assessment & Plan    1. Atherosclerosis of both carotid arteries (Primary)  -     Atorvastatin Calcium; Take 1 tablet (40 mg total) by mouth nightly.  Dispense: 90 tablet; Refill: 3  The patient's ASCVD 10 year risk score is 43.6. followed by vascular on plavix  Plan:  -start Atorvastatin 40mg daily,     2. Atherosclerosis of arteries of extremities (HCC)  -     Atorvastatin Calcium; Take 1 tablet (40 mg total) by mouth nightly.  Dispense: 90 tablet; Refill: 3  The patient's ASCVD 10 year risk score is 43.6. followed by vascular on plavix  Plan:  -start Atorvastatin 40mg daily,   3. Venous stasis ulcer of right heel with varicose veins, unspecified ulcer stage (HCC)  4. Oncycomycosis  -     Ammonium Lactate; Apply 1 Application topically 2 (two) times daily.  Dispense: 385 g; Refill: 3  -     Podiatry Referral  Apply ammonium lactate BID to right heel/skin  -encourage to remain on statin and plavix for atherosclerosis  -follow up with podiatry if needs debridement and toenail care.       Follow Up: As needed/if symptoms worsen or No follow-ups on file..     I spent  32 minutes obtaining pertitent medical history, reviewing pertinent imaging/labs and specialists notes, evaluating patient, discussing differential diagnosis' and various treatment options, reinforcing importance of  compliance with treatment plan, and completing documentation.      Objective/ Results:   Physical Exam  Constitutional:       Appearance: She is well-developed.   Cardiovascular:      Rate and Rhythm: Normal rate and regular rhythm.      Heart sounds: Normal heart sounds.   Pulmonary:      Effort: Pulmonary effort is normal.      Breath sounds: Normal breath sounds.   Abdominal:      General: Bowel sounds are normal.      Palpations: Abdomen is soft.   Musculoskeletal:      Right foot: Decreased capillary refill. Tenderness (right heel) present. No swelling. Abnormal pulse (diminished and).      Comments: Diminished pulses to right 1st and 3rd toe, cool to palpation. Also has skin breakdown of heel but no erythema/edema     Skin:     General: Skin is warm and dry.   Neurological:      Mental Status: She is alert and oriented to person, place, and time.      Deep Tendon Reflexes: Reflexes are normal and symmetric.        Reviewed:    Patient Active Problem List    Diagnosis    Atherosclerosis of arteries of extremities (HCC)    Atherosclerosis of both carotid arteries    Right foot pain    History of right breast cancer    Hyperlipidemia      No Known Allergies     Social History     Socioeconomic History    Marital status: Single    Number of children: 3   Occupational History    Occupation: retired      Comment: office work    Tobacco Use    Smoking status: Every Day     Current packs/day: 0.04     Average packs/day: (2.1 ttl pk-yrs)     Types: Cigarettes     Passive exposure: Current    Smokeless tobacco: Never    Tobacco comments:     About 4 cigarrettes a day   Vaping Use    Vaping status: Never Used   Substance and Sexual Activity    Alcohol use: Yes     Alcohol/week: 4.0 standard drinks of alcohol     Types: 4 Cans of beer per week     Comment: occasionally    Drug use: No   Other Topics Concern     Service No    Caffeine Concern Yes     Comment: Coffee 3 cups daily, Soda    Occupational Exposure No       Review of Systems   Constitutional: Negative.  Negative for chills, fatigue and fever.   HENT:  Negative for congestion and sore throat.    Respiratory: Negative.  Negative for cough.    Cardiovascular: Negative.  Negative for chest pain.   Gastrointestinal: Negative.  Negative for abdominal pain, anorexia, change in bowel habit, nausea and vomiting.   Musculoskeletal:  Negative for arthralgias, joint swelling, myalgias and neck pain.   Skin:  Positive for color change (dark dusky color). Negative for rash.   Neurological: Negative.  Negative for vertigo, weakness, numbness and headaches.     All other systems negative unless otherwise stated in ROS or HPI above.       Ronen Ceron MD  Internal Medicine       Call office with any questions or seek emergency care if necessary.   Patient understands and agrees to follow directions and advice.      ----------------------------------------- PATIENT INSTRUCTIONS-----------------------------------------     There are no Patient Instructions on file for this visit.

## 2024-07-30 RX ORDER — AMLODIPINE BESYLATE 10 MG/1
10 TABLET ORAL DAILY
Qty: 90 TABLET | Refills: 3 | Status: SHIPPED | OUTPATIENT
Start: 2024-07-30

## 2024-07-31 NOTE — TELEPHONE ENCOUNTER
Refill passed per [facility] protocol.    Requested Prescriptions   Pending Prescriptions Disp Refills    AMLODIPINE 10 MG Oral Tab [Pharmacy Med Name: AMLODIPINE BESYLATE 10MG TABLETS] 90 tablet 1     Sig: TAKE 1 TABLET(10 MG) BY MOUTH DAILY       Hypertension Medications Protocol Passed - 7/28/2024  1:57 PM        Passed - CMP or BMP in past 12 months        Passed - Last BP reading less than 140/90     BP Readings from Last 1 Encounters:   05/07/24 136/71               Passed - In person appointment or virtual visit in the past 12 mos or appointment in next 3 mos     Recent Outpatient Visits              2 months ago [facility]ros[facility] both carotid arteries    [facility]    Office Visit    4 months ago Carotid artery disease without cerebral infarction (HCC)    [facility] [place] Najjar, Samer F, MD    Office Visit    1 year ago Medicare annual wellness visit, subsequent    [facility]    Office Visit    2 years ago Medicare annual wellness visit, subsequent    [facility]    Office Visit    3 years ago Medicare annual wellness visit, subsequent    [facility]    [facility]          Future Appointments         Provider Department Appt Notes    In 1 week [facility] Simons MD [facility]                    Passed - EGFRCR or GFRNAA > 50     GFR Evaluation  EGFRCR: 70 , resulted on 3/7/2024             Future Appointments         Provider Department Appt Notes    In 1 week [facility]          Recent Outpatient Visits              2 months ago Atherosclerosis of both carotid arteries    [facility]  [facility], [facility], Ronen Vaz MD    Office Visit    4 months ago Carotid artery disease without cerebral infarction (HCC)    [facility], [facility], [place] Najjar, Samer F, MD    Office Visit    1 year ago Medicare annual wellness visit, subsequent    [facility], [facility], Kimberly Swan MD    Office Visit    2 years ago Medicare annual wellness visit, subsequent    [facility], [facility], Kimberly Swan MD    Office Visit    3 years ago Medicare annual wellness visit, subsequent    [facility], [facility], Kimberly Swan MD    Office Visit

## 2024-08-07 ENCOUNTER — LAB ENCOUNTER (OUTPATIENT)
Dept: LAB | Age: 79
End: 2024-08-07
Attending: INTERNAL MEDICINE
Payer: MEDICARE

## 2024-08-07 ENCOUNTER — OFFICE VISIT (OUTPATIENT)
Dept: INTERNAL MEDICINE CLINIC | Facility: CLINIC | Age: 79
End: 2024-08-07
Payer: MEDICARE

## 2024-08-07 VITALS
HEART RATE: 98 BPM | HEIGHT: 66 IN | WEIGHT: 137.25 LBS | BODY MASS INDEX: 22.06 KG/M2 | SYSTOLIC BLOOD PRESSURE: 147 MMHG | DIASTOLIC BLOOD PRESSURE: 70 MMHG | RESPIRATION RATE: 17 BRPM

## 2024-08-07 DIAGNOSIS — I10 ESSENTIAL HYPERTENSION: ICD-10-CM

## 2024-08-07 DIAGNOSIS — Z12.31 VISIT FOR SCREENING MAMMOGRAM: ICD-10-CM

## 2024-08-07 DIAGNOSIS — Z72.0 TOBACCO ABUSE: ICD-10-CM

## 2024-08-07 DIAGNOSIS — E78.2 MIXED HYPERLIPIDEMIA: ICD-10-CM

## 2024-08-07 DIAGNOSIS — I70.209 ATHEROSCLEROSIS OF ARTERIES OF EXTREMITIES (HCC): ICD-10-CM

## 2024-08-07 DIAGNOSIS — Z00.00 MEDICARE ANNUAL WELLNESS VISIT, SUBSEQUENT: Primary | ICD-10-CM

## 2024-08-07 DIAGNOSIS — Z85.3 HISTORY OF RIGHT BREAST CANCER: ICD-10-CM

## 2024-08-07 DIAGNOSIS — I65.23 ATHEROSCLEROSIS OF BOTH CAROTID ARTERIES: ICD-10-CM

## 2024-08-07 PROBLEM — M79.671 RIGHT FOOT PAIN: Status: RESOLVED | Noted: 2024-03-07 | Resolved: 2024-08-07

## 2024-08-07 LAB
ALBUMIN SERPL-MCNC: 4.4 G/DL (ref 3.2–4.8)
ALBUMIN/GLOB SERPL: 1.7 {RATIO} (ref 1–2)
ALP LIVER SERPL-CCNC: 113 U/L
ALT SERPL-CCNC: 12 U/L
ANION GAP SERPL CALC-SCNC: 5 MMOL/L (ref 0–18)
AST SERPL-CCNC: 18 U/L (ref ?–34)
BASOPHILS # BLD AUTO: 0.07 X10(3) UL (ref 0–0.2)
BASOPHILS NFR BLD AUTO: 0.7 %
BILIRUB SERPL-MCNC: 0.4 MG/DL (ref 0.2–1.1)
BILIRUB UR QL: NEGATIVE
BUN BLD-MCNC: 10 MG/DL (ref 9–23)
BUN/CREAT SERPL: 13.7 (ref 10–20)
CALCIUM BLD-MCNC: 9.5 MG/DL (ref 8.7–10.4)
CHLORIDE SERPL-SCNC: 109 MMOL/L (ref 98–112)
CHOLEST SERPL-MCNC: 216 MG/DL (ref ?–200)
CO2 SERPL-SCNC: 26 MMOL/L (ref 21–32)
COLOR UR: YELLOW
CREAT BLD-MCNC: 0.73 MG/DL
DEPRECATED RDW RBC AUTO: 43.9 FL (ref 35.1–46.3)
EGFRCR SERPLBLD CKD-EPI 2021: 85 ML/MIN/1.73M2 (ref 60–?)
EOSINOPHIL # BLD AUTO: 0.06 X10(3) UL (ref 0–0.7)
EOSINOPHIL NFR BLD AUTO: 0.6 %
ERYTHROCYTE [DISTWIDTH] IN BLOOD BY AUTOMATED COUNT: 12.6 % (ref 11–15)
FASTING PATIENT LIPID ANSWER: NO
FASTING STATUS PATIENT QL REPORTED: NO
GLOBULIN PLAS-MCNC: 2.6 G/DL (ref 2–3.5)
GLUCOSE BLD-MCNC: 100 MG/DL (ref 70–99)
GLUCOSE UR-MCNC: NORMAL MG/DL
HCT VFR BLD AUTO: 43.1 %
HDLC SERPL-MCNC: 76 MG/DL (ref 40–59)
HGB BLD-MCNC: 15 G/DL
HGB UR QL STRIP.AUTO: NEGATIVE
IMM GRANULOCYTES # BLD AUTO: 0.02 X10(3) UL (ref 0–1)
IMM GRANULOCYTES NFR BLD: 0.2 %
KETONES UR-MCNC: NEGATIVE MG/DL
LDLC SERPL CALC-MCNC: 81 MG/DL (ref ?–100)
LEUKOCYTE ESTERASE UR QL STRIP.AUTO: NEGATIVE
LYMPHOCYTES # BLD AUTO: 1.89 X10(3) UL (ref 1–4)
LYMPHOCYTES NFR BLD AUTO: 19.4 %
MCH RBC QN AUTO: 33 PG (ref 26–34)
MCHC RBC AUTO-ENTMCNC: 34.8 G/DL (ref 31–37)
MCV RBC AUTO: 94.9 FL
MONOCYTES # BLD AUTO: 0.89 X10(3) UL (ref 0.1–1)
MONOCYTES NFR BLD AUTO: 9.1 %
NEUTROPHILS # BLD AUTO: 6.83 X10 (3) UL (ref 1.5–7.7)
NEUTROPHILS # BLD AUTO: 6.83 X10(3) UL (ref 1.5–7.7)
NEUTROPHILS NFR BLD AUTO: 70 %
NITRITE UR QL STRIP.AUTO: NEGATIVE
NONHDLC SERPL-MCNC: 140 MG/DL (ref ?–130)
OSMOLALITY SERPL CALC.SUM OF ELEC: 289 MOSM/KG (ref 275–295)
PH UR: 5.5 [PH] (ref 5–8)
PLATELET # BLD AUTO: 472 10(3)UL (ref 150–450)
POTASSIUM SERPL-SCNC: 4.2 MMOL/L (ref 3.5–5.1)
PROT SERPL-MCNC: 7 G/DL (ref 5.7–8.2)
PROT UR-MCNC: 70 MG/DL
RBC # BLD AUTO: 4.54 X10(6)UL
SODIUM SERPL-SCNC: 140 MMOL/L (ref 136–145)
SP GR UR STRIP: 1.01 (ref 1–1.03)
T4 FREE SERPL-MCNC: 1 NG/DL (ref 0.8–1.7)
TRIGL SERPL-MCNC: 369 MG/DL (ref 30–149)
TSI SER-ACNC: 1.78 MIU/ML (ref 0.55–4.78)
UROBILINOGEN UR STRIP-ACNC: NORMAL
VLDLC SERPL CALC-MCNC: 59 MG/DL (ref 0–30)
WBC # BLD AUTO: 9.8 X10(3) UL (ref 4–11)

## 2024-08-07 PROCEDURE — 85025 COMPLETE CBC W/AUTO DIFF WBC: CPT

## 2024-08-07 PROCEDURE — 80061 LIPID PANEL: CPT

## 2024-08-07 PROCEDURE — 99213 OFFICE O/P EST LOW 20 MIN: CPT | Performed by: INTERNAL MEDICINE

## 2024-08-07 PROCEDURE — 80053 COMPREHEN METABOLIC PANEL: CPT

## 2024-08-07 PROCEDURE — 36415 COLL VENOUS BLD VENIPUNCTURE: CPT

## 2024-08-07 PROCEDURE — G0439 PPPS, SUBSEQ VISIT: HCPCS | Performed by: INTERNAL MEDICINE

## 2024-08-07 PROCEDURE — 84439 ASSAY OF FREE THYROXINE: CPT

## 2024-08-07 PROCEDURE — 81001 URINALYSIS AUTO W/SCOPE: CPT

## 2024-08-07 PROCEDURE — 84443 ASSAY THYROID STIM HORMONE: CPT

## 2024-08-07 RX ORDER — LOSARTAN POTASSIUM 100 MG/1
100 TABLET ORAL DAILY
Qty: 90 TABLET | Refills: 1 | Status: SHIPPED | OUTPATIENT
Start: 2024-08-07

## 2024-08-07 NOTE — PROGRESS NOTES
Subjective:   Emily Rice is a 78 year old female who presents for a Medicare Subsequent Annual Wellness visit (Pt already had Initial Annual Wellness) and scheduled follow up of multiple significant but stable problems.   Decline prevnar 20   Still smoke cigarette  pt pt  not as much will not quantify  Hx of athreosclerosis  carotid and extermiteis  High cholesterol     Hx of right breast cancer due for mammogram    History/Other:   Fall Risk Assessment:   She has been screened for Falls and is low risk.      Cognitive Assessment:   She had a completely normal cognitive assessment - see flowsheet entries       Functional Ability/Status:   Emily Rice has some abnormal functions as listed below:  She has Driving difficulties based on screening of functional status.       Depression Screening (PHQ):  PHQ-2 SCORE: 0  , done 8/7/2024            Advanced Directives:   She does have a Living Will but we do NOT have it on file in Epic.    She does have a POA but we do NOT have it on file in Epic.    Discussed Advance Care Planning with patient (and family/surrogate if present). Standard forms made available to patient in After Visit Summary.      Patient Active Problem List   Diagnosis    History of right breast cancer    Hyperlipidemia    Atherosclerosis of arteries of extremities (HCC)    Atherosclerosis of both carotid arteries     Allergies:  She has No Known Allergies.    Current Medications:  Outpatient Medications Marked as Taking for the 8/7/24 encounter (Office Visit) with Kimberly Simons MD   Medication Sig    losartan 100 MG Oral Tab Take 1 tablet (100 mg total) by mouth daily.    amLODIPine 10 MG Oral Tab Take 1 tablet (10 mg total) by mouth daily.    atorvastatin 40 MG Oral Tab Take 1 tablet (40 mg total) by mouth nightly.    clopidogrel 75 MG Oral Tab Take 1 tablet (75 mg total) by mouth daily.    cholecalciferol 50 MCG (2000 UT) Oral Cap Take 1 capsule (2,000 Units total) by mouth daily.        Medical History:  She  has a past medical history of Abnormal vaginal bleeding (1976), At high risk for breast cancer (5/24/2016), Breast cancer, left breast (HCC) (2010), Breast cancer, right (HCC) (1990), Hemorrhoids, History of breast cancer, History of lumpectomy, HTN (hypertension), Musculoskeletal disorder, Osteoporosis screening (12/15/2010), Status post mastectomy (5/11/2016), Thrombocytosis (5/23/2016), and Uncontrolled hypertension (5/11/2016).  Surgical History:  She  has a past surgical history that includes hysterectomy (1979); lumpectomy left (2010); mastectomy right (1990); radiation left (2010); and chemotherapy (1990).   Family History:  Her family history includes Breast Cancer in her self; Heart Disease in her mother; Other in her father; alive and well in her daughter; atrial fib in her sister; bowel resection in her sister.  Social History:  She  reports that she has been smoking cigarettes. She has a 2.1 pack-year smoking history. She has been exposed to tobacco smoke. She has never used smokeless tobacco. She reports current alcohol use of about 4.0 standard drinks of alcohol per week. She reports that she does not use drugs.    Tobacco:  Social History     Tobacco Use   Smoking Status Every Day    Current packs/day: 0.04    Average packs/day: (2.1 ttl pk-yrs)    Types: Cigarettes    Passive exposure: Current   Smokeless Tobacco Never   Tobacco Comments    About 4 cigarrettes a day     E-Cigarettes/Vaping       Questions Responses    E-Cigarette Use Never User           Tobacco cessation counseling for <3 minutes.      CAGE Alcohol Screen:   CAGE screening score of 0 on 8/7/2024, showing low risk of alcohol abuse.      Patient Care Team:  Kimberly Simons MD as PCP - General (Internal Medicine)    Review of Systems   Constitutional:  Negative for activity change, chills, fatigue and fever.   HENT:  Negative for ear discharge, nosebleeds, postnasal drip, rhinorrhea, sinus pressure and  sore throat.    Eyes:  Negative for pain, discharge and redness.   Respiratory:  Positive for shortness of breath (at baseline). Negative for cough, chest tightness and wheezing.    Cardiovascular:  Negative for chest pain, palpitations and leg swelling.   Gastrointestinal:  Negative for abdominal pain, blood in stool, constipation, diarrhea, nausea and vomiting.   Genitourinary:  Negative for difficulty urinating, dysuria, frequency, hematuria and urgency.   Musculoskeletal:  Negative for back pain, gait problem and joint swelling.   Skin:  Negative for rash.   Neurological:  Negative for syncope, weakness, light-headedness and headaches.   Psychiatric/Behavioral:  Negative for dysphoric mood. The patient is not nervous/anxious.          Objective:   Physical Exam  Constitutional:       Appearance: She is well-developed. She is not ill-appearing.   HENT:      Right Ear: Ear canal normal.      Left Ear: Ear canal normal.      Mouth/Throat:      Pharynx: Oropharynx is clear.   Eyes:      Extraocular Movements: Extraocular movements intact.      Conjunctiva/sclera: Conjunctivae normal.      Pupils: Pupils are equal, round, and reactive to light.   Cardiovascular:      Rate and Rhythm: Normal rate and regular rhythm.      Heart sounds: Normal heart sounds.   Pulmonary:      Effort: Pulmonary effort is normal.      Comments: Decrease breath sounds  Abdominal:      General: Bowel sounds are normal.      Palpations: Abdomen is soft.   Skin:     General: Skin is warm and dry.   Neurological:      Mental Status: She is alert.   Psychiatric:         Mood and Affect: Mood normal.           /70   Pulse 98   Resp 17   Ht 5' 6\" (1.676 m)   Wt 137 lb 4 oz (62.3 kg)   LMP  (LMP Unknown)   BMI 22.15 kg/m²  Estimated body mass index is 22.15 kg/m² as calculated from the following:    Height as of this encounter: 5' 6\" (1.676 m).    Weight as of this encounter: 137 lb 4 oz (62.3 kg).    Medicare Hearing Assessment:    Hearing Screening    Screening Method: Finger Rub  Finger Rub Result: Pass         Visual Acuity:   Right Eye Visual Acuity: Uncorrected Right Eye Chart Acuity: 20/30   Left Eye Visual Acuity: Uncorrected Left Eye Chart Acuity: 20/30   Both Eyes Visual Acuity: Uncorrected Both Eyes Chart Acuity: 20/30   Able To Tolerate Visual Acuity: Yes        Assessment & Plan:   Emily Rice is a 78 year old female who presents for a Medicare Assessment.   (Z00.00) Medicare annual wellness visit, subsequent  (primary encounter diagnosis)  Plan: Patient is independent with ADL.s    Health screening   Colonoscopy, mammography, dexa scan  And routine eye exam advised.      (I10) Essential hypertension  Plan: Urinalysis, Routine     /70   Pulse 98   Resp 17   Ht 5' 6\" (1.676 m)   Wt 137 lb 4 oz (62.3 kg)   LMP  (LMP Unknown)   BMI 22.15 kg/m²   Low salt diet advised  Monitor blood pressure daily and record  Follow up in a month      (E78.2) Mixed hyperlipidemia  Plan: CBC With Differential With Platelet, Comp         Metabolic Panel (14), Lipid Panel, TSH and Free        T4        Low cholesterol diet advised  Avoid saturated and trans fats   Per pt will not take statin    (Z72.0) Tobacco abuse  Plan: completes smoking cessation adivsed    (I65.23) Atherosclerosis of both carotid arteries  Plan: Asymptomatic  monitor      (I70.209) Atherosclerosis of arteries of extremities (HCC)  Plan: Asymptomatic  monitor      (Z85.3) History of right breast cancer  Plan: no known recurrence    (Z12.31) Visit for screening mammogram  Plan: [facility] 2D+3D SCREENING LEFT         (CPT=77067-52/52986)        Right breast mastecomy no skin lesion  Left breast  no discreete mass no nipple discharge and no bilateal adenopathy    Medications and most recent test results reviewed  Refill medicaitons  as needed  Potential side effect discussed  Modification of risk for CAD advised    Dietary an lifestyle change  Pt voiced understanding  and agrees with plan  Pt given time to ask questions  After Visit Summary handout    Discussed  And given to patient.          The patient indicates understanding of these issues and agrees to the plan.  Reinforced healthy diet, lifestyle, and exercise.      No follow-ups on file.     Kimberly Simons MD, 8/7/2024     Supplementary Documentation:   General Health:  In the past six months, have you lost more than 10 pounds without trying?: 2 - No  Has your appetite been poor?: No  Type of Diet: Balanced  How does the patient maintain a good energy level?: Other  How would you describe your daily physical activity?: Moderate  How would you describe your current health state?: Good  How do you maintain positive mental well-being?: Puzzles  On a scale of 0 to 10, with 0 being no pain and 10 being severe pain, what is your pain level?: 0 - (None)  In the past six months, have you experienced urine leakage?: 0-No  At any time do you feel concerned for the safety/well-being of yourself and/or your children, in your home or elsewhere?: No  Have you had any immunizations at another office such as Influenza, Hepatitis B, Tetanus, or Pneumococcal?: No    Health Maintenance   Topic Date Due    Pneumococcal Vaccine: 65+ Years (1 of 2 - PCV) Never done    Zoster Vaccines (1 of 2) Never done    COVID-19 Vaccine (2 - 2023-24 season) 09/01/2023    Annual Depression Screening  01/01/2024    Tobacco Cessation Counseling  Never done    Annual Physical  07/26/2024    Influenza Vaccine (1) 10/01/2024    DEXA Scan  Completed    Fall Risk Screening (Annual)  Completed    Mammogram  Discontinued

## 2024-08-30 RX ORDER — CLOPIDOGREL BISULFATE 75 MG/1
75 TABLET ORAL DAILY
Qty: 90 TABLET | Refills: 1 | Status: SHIPPED | OUTPATIENT
Start: 2024-08-30

## 2024-11-27 ENCOUNTER — APPOINTMENT (OUTPATIENT)
Dept: CT IMAGING | Facility: HOSPITAL | Age: 79
End: 2024-11-27
Attending: EMERGENCY MEDICINE
Payer: MEDICARE

## 2024-11-27 ENCOUNTER — HOSPITAL ENCOUNTER (INPATIENT)
Facility: HOSPITAL | Age: 79
LOS: 2 days | Discharge: HOME | End: 2024-11-29
Attending: EMERGENCY MEDICINE | Admitting: HOSPITALIST
Payer: MEDICARE

## 2024-11-27 DIAGNOSIS — K57.92 ACUTE DIVERTICULITIS: Primary | ICD-10-CM

## 2024-11-27 PROBLEM — K57.20 PERFORATION OF SIGMOID COLON DUE TO DIVERTICULITIS: Status: ACTIVE | Noted: 2024-11-27

## 2024-11-27 LAB
ALBUMIN SERPL-MCNC: 4.6 G/DL (ref 3.2–4.8)
ALP LIVER SERPL-CCNC: 108 U/L
ALT SERPL-CCNC: 13 U/L
ANION GAP SERPL CALC-SCNC: 8 MMOL/L (ref 0–18)
AST SERPL-CCNC: 21 U/L (ref ?–34)
BASOPHILS # BLD AUTO: 0.06 X10(3) UL (ref 0–0.2)
BASOPHILS NFR BLD AUTO: 0.4 %
BILIRUB DIRECT SERPL-MCNC: 0.2 MG/DL (ref ?–0.3)
BILIRUB SERPL-MCNC: 0.6 MG/DL (ref 0.2–1.1)
BILIRUB UR QL: NEGATIVE
BUN BLD-MCNC: 7 MG/DL (ref 9–23)
BUN/CREAT SERPL: 11.1 (ref 10–20)
CALCIUM BLD-MCNC: 9.6 MG/DL (ref 8.7–10.4)
CHLORIDE SERPL-SCNC: 102 MMOL/L (ref 98–112)
CLARITY UR: CLEAR
CO2 SERPL-SCNC: 24 MMOL/L (ref 21–32)
COLOR UR: YELLOW
CREAT BLD-MCNC: 0.63 MG/DL
DEPRECATED RDW RBC AUTO: 43.4 FL (ref 35.1–46.3)
EGFRCR SERPLBLD CKD-EPI 2021: 91 ML/MIN/1.73M2 (ref 60–?)
EOSINOPHIL # BLD AUTO: 0.01 X10(3) UL (ref 0–0.7)
EOSINOPHIL NFR BLD AUTO: 0.1 %
ERYTHROCYTE [DISTWIDTH] IN BLOOD BY AUTOMATED COUNT: 12.7 % (ref 11–15)
GLUCOSE BLD-MCNC: 104 MG/DL (ref 70–99)
GLUCOSE UR-MCNC: NORMAL MG/DL
HCT VFR BLD AUTO: 50.5 %
HGB BLD-MCNC: 17.7 G/DL
HGB UR QL STRIP.AUTO: NEGATIVE
IMM GRANULOCYTES # BLD AUTO: 0.04 X10(3) UL (ref 0–1)
IMM GRANULOCYTES NFR BLD: 0.3 %
KETONES UR-MCNC: 10 MG/DL
LACTATE SERPL-SCNC: 1 MMOL/L (ref 0.5–2)
LEUKOCYTE ESTERASE UR QL STRIP.AUTO: 75
LIPASE SERPL-CCNC: 36 U/L (ref 12–53)
LYMPHOCYTES # BLD AUTO: 1.26 X10(3) UL (ref 1–4)
LYMPHOCYTES NFR BLD AUTO: 8.6 %
MCH RBC QN AUTO: 32.3 PG (ref 26–34)
MCHC RBC AUTO-ENTMCNC: 35 G/DL (ref 31–37)
MCV RBC AUTO: 92.2 FL
MONOCYTES # BLD AUTO: 1.2 X10(3) UL (ref 0.1–1)
MONOCYTES NFR BLD AUTO: 8.2 %
NEUTROPHILS # BLD AUTO: 12.14 X10 (3) UL (ref 1.5–7.7)
NEUTROPHILS # BLD AUTO: 12.14 X10(3) UL (ref 1.5–7.7)
NEUTROPHILS NFR BLD AUTO: 82.4 %
NITRITE UR QL STRIP.AUTO: NEGATIVE
OSMOLALITY SERPL CALC.SUM OF ELEC: 276 MOSM/KG (ref 275–295)
PH UR: 7 [PH] (ref 5–8)
PLATELET # BLD AUTO: 412 10(3)UL (ref 150–450)
POTASSIUM SERPL-SCNC: 4.6 MMOL/L (ref 3.5–5.1)
PROT SERPL-MCNC: 7.1 G/DL (ref 5.7–8.2)
PROT UR-MCNC: 50 MG/DL
RBC # BLD AUTO: 5.48 X10(6)UL
SODIUM SERPL-SCNC: 134 MMOL/L (ref 136–145)
SP GR UR STRIP: 1.02 (ref 1–1.03)
UROBILINOGEN UR STRIP-ACNC: 3
WBC # BLD AUTO: 14.7 X10(3) UL (ref 4–11)

## 2024-11-27 PROCEDURE — 99223 1ST HOSP IP/OBS HIGH 75: CPT | Performed by: INTERNAL MEDICINE

## 2024-11-27 PROCEDURE — 99223 1ST HOSP IP/OBS HIGH 75: CPT | Performed by: HOSPITALIST

## 2024-11-27 PROCEDURE — 74177 CT ABD & PELVIS W/CONTRAST: CPT | Performed by: EMERGENCY MEDICINE

## 2024-11-27 RX ORDER — METOCLOPRAMIDE HYDROCHLORIDE 5 MG/ML
10 INJECTION INTRAMUSCULAR; INTRAVENOUS EVERY 8 HOURS PRN
Status: DISCONTINUED | OUTPATIENT
Start: 2024-11-27 | End: 2024-11-29

## 2024-11-27 RX ORDER — MORPHINE SULFATE 2 MG/ML
2 INJECTION, SOLUTION INTRAMUSCULAR; INTRAVENOUS EVERY 2 HOUR PRN
Status: DISCONTINUED | OUTPATIENT
Start: 2024-11-27 | End: 2024-11-29

## 2024-11-27 RX ORDER — MORPHINE SULFATE 4 MG/ML
4 INJECTION, SOLUTION INTRAMUSCULAR; INTRAVENOUS EVERY 2 HOUR PRN
Status: DISCONTINUED | OUTPATIENT
Start: 2024-11-27 | End: 2024-11-29

## 2024-11-27 RX ORDER — ONDANSETRON 2 MG/ML
4 INJECTION INTRAMUSCULAR; INTRAVENOUS EVERY 6 HOURS PRN
Status: DISCONTINUED | OUTPATIENT
Start: 2024-11-27 | End: 2024-11-29

## 2024-11-27 RX ORDER — DEXTROSE MONOHYDRATE, SODIUM CHLORIDE, AND POTASSIUM CHLORIDE 50; 1.49; 4.5 G/1000ML; G/1000ML; G/1000ML
INJECTION, SOLUTION INTRAVENOUS CONTINUOUS
Status: DISCONTINUED | OUTPATIENT
Start: 2024-11-27 | End: 2024-11-27 | Stop reason: ALTCHOICE

## 2024-11-27 RX ORDER — HEPARIN SODIUM 5000 [USP'U]/ML
5000 INJECTION, SOLUTION INTRAVENOUS; SUBCUTANEOUS EVERY 12 HOURS SCHEDULED
Status: CANCELLED | OUTPATIENT
Start: 2024-11-27

## 2024-11-27 RX ORDER — ACETAMINOPHEN 500 MG
500 TABLET ORAL EVERY 4 HOURS PRN
Status: DISCONTINUED | OUTPATIENT
Start: 2024-11-27 | End: 2024-11-29

## 2024-11-27 RX ORDER — TEMAZEPAM 15 MG/1
15 CAPSULE ORAL NIGHTLY PRN
Status: DISCONTINUED | OUTPATIENT
Start: 2024-11-27 | End: 2024-11-29

## 2024-11-27 RX ORDER — SODIUM CHLORIDE 9 MG/ML
INJECTION, SOLUTION INTRAVENOUS CONTINUOUS
Status: CANCELLED | OUTPATIENT
Start: 2024-11-27

## 2024-11-27 RX ORDER — DEXTROSE MONOHYDRATE, SODIUM CHLORIDE, AND POTASSIUM CHLORIDE 50; .745; 4.5 G/1000ML; G/1000ML; G/1000ML
INJECTION, SOLUTION INTRAVENOUS CONTINUOUS
Status: DISCONTINUED | OUTPATIENT
Start: 2024-11-27 | End: 2024-11-29

## 2024-11-27 RX ORDER — LOSARTAN POTASSIUM 100 MG/1
100 TABLET ORAL DAILY
Status: DISCONTINUED | OUTPATIENT
Start: 2024-11-27 | End: 2024-11-29

## 2024-11-27 RX ORDER — MORPHINE SULFATE 2 MG/ML
1 INJECTION, SOLUTION INTRAMUSCULAR; INTRAVENOUS EVERY 2 HOUR PRN
Status: DISCONTINUED | OUTPATIENT
Start: 2024-11-27 | End: 2024-11-29

## 2024-11-27 RX ORDER — NICOTINE 21 MG/24HR
1 PATCH, TRANSDERMAL 24 HOURS TRANSDERMAL DAILY PRN
Status: DISCONTINUED | OUTPATIENT
Start: 2024-11-27 | End: 2024-11-29

## 2024-11-27 RX ORDER — AMLODIPINE BESYLATE 10 MG/1
10 TABLET ORAL DAILY
Status: DISCONTINUED | OUTPATIENT
Start: 2024-11-27 | End: 2024-11-29

## 2024-11-27 NOTE — H&P
[facility]    PATIENT'S NAME: ALEXANDRA GARDNER   ATTENDING PHYSICIAN: Eliel Chapman MD   PATIENT ACCOUNT#:   [id number]    LOCATION:  [address]  MEDICAL RECORD #:   [id number]       YOB: 1945  ADMISSION DATE:       11/27/2024    HISTORY AND PHYSICAL EXAMINATION    CHIEF COMPLAINT:  Sigmoid diverticulitis and possible colovaginal fistula.     HISTORY OF PRESENT ILLNESS:  Patient is a 79-year-old  female who came into the emergency department for evaluation of left lower quadrant abdominal pain for the last 2 to 3 days.  CBC showed white blood cell count of 14.7 with left shift.  Chemistry and liver function tests were unremarkable.  Urinalysis showed possible urinary tract infection.  CT scan of the abdomen and pelvis showed acute diverticulitis involving the sigmoid colon with peripherally enhancing collection extending from the undersurface of the sigmoid colon to the left vaginal cuff, measuring 2.3 cm in maximal dimension, which may reflect colovaginal fistula or abscess.  Left posterolateral bladder wall thickening measuring 10 mm in thickness, could be reactive secondary to adjacent acute diverticulitis or less likely reflect an underlying bladder mass.  No other acute findings.  Patient was started on IV antibiotics, and she will be admitted to the hospital for further management.    PAST MEDICAL HISTORY:  Hypertension, chronic thrombocytosis, peripheral arterial disease, hyperlipidemia.     PAST SURGICAL HISTORY:  Left breast lumpectomy plus radiation for breast cancer, right mastectomy for breast cancer, hysterectomy.    MEDICATIONS:  Please see medication reconciliation list.       ALLERGIES:  No known drug allergies.    FAMILY HISTORY:  Mother had heart disease.  Father had liver cirrhosis.     SOCIAL HISTORY:  Chronic tobacco use.  Social alcohol.  No drug use.  Lives with her family.  Independent in her basic activities of daily living.     REVIEW OF  SYSTEMS:  Left lower quadrant abdominal pain associated with malaise and fatigue, poor appetite for the last 2 to 3 days.  Patient denies any fever or chills at home.  Denies any nausea or vomiting, but she did not have much of an appetite.  Other 12-point review of systems is negative.        PHYSICAL EXAMINATION:    GENERAL:  Alert, oriented to time, place, and person.  Mild to moderate distress.   VITAL SIGNS:  Temperature 97.5, pulse 100, respiratory rate 19, blood pressure 147/75, pulse ox 96% on room air.    HEENT:  Atraumatic.  Oropharynx clear.  Dry mucous membranes.  Ears, nose normal.  Eyes:  Anicteric sclerae.    NECK:  Supple.  No lymphadenopathy.  Trachea midline.  Full range of motion.    LUNGS:  Clear to auscultation bilaterally.  Diminished breathing sounds.   HEART:  Regular rate and rhythm.  S1, S2 auscultated.  No murmur.    ABDOMEN:  Soft, nondistended.  Tenderness noted in left lower quadrant area.  No guarding or rebound tenderness.  Hypoactive bowel sounds.    EXTREMITIES:  No peripheral edema, clubbing, or cyanosis.    NEUROLOGIC:  Motor and sensory intact.    ASSESSMENT:    1.   Sigmoid colon diverticulitis with inferior pericolonic abscess versus colovaginal fistula.  Patient denies any vaginal discharge.  2.   Peripheral arterial disease.  3.   Essential hypertension.    PLAN:  Patient will be admitted to general medical floor.  IV antibiotics, n.p.o. except sips of clear liquids, pain and nausea control.  Monitor hemodynamic status.  Gastroenterology and general surgery consults.  Patient reported that she never had a colonoscopy in the past.  Further recommendations to follow.    Dictated By America Burns MD  d: 11/27/2024 16:11:44  t: 11/27/2024 17:38:30  [facility] [id number]  FB/    cc: Eliel Chapman MD

## 2024-11-27 NOTE — CONSULTS
[facility]  part of [facility]    Report of Consultation    Emily Rice Patient Status:  Emergency    9/3/1945 MRN [id number]   Location [facility] EMERGENCY DEPARTMENT Attending Eliel Chapman MD   Hosp Day # 0 PCP Kimberly Simons MD     Date of Admission:  2024  Date of Consult:  2024    Reason for Consultation:  Abd pain    History of Present Illness:  Emily Rice is a a(n) 79 year old female. 2 day hx of LLQ pain   no n or v or change in bowels   See PMH below    Was on blood thinner for unknown vascular issue of legs  No vag drainage  no UTI's       History:  Past Medical History:    Abnormal vaginal bleeding    At high risk for breast cancer    Breast cancer, left breast (HCC)    lumpectomy and RT    Breast cancer, right (HCC)    mastectomy and CMF    Essential hypertension    Hemorrhoids    History of breast cancer    History of lumpectomy    HTN (hypertension)    Musculoskeletal disorder    sciatic nerve problems    Osteoporosis screening    Status post mastectomy    Thrombocytosis    Uncontrolled hypertension     Past Surgical History:   Procedure Laterality Date    Chemotherapy      right breast ca    Hysterectomy      States one ovary left in place.    Lumpectomy left  2010    Mastectomy right  1990    Radiation left  2010     Family History   Problem Relation Age of Onset    Other (Other) Father         Cirrhosis    Heart Disease Mother     Other (atrial fib) Sister     Other (bowel resection) Sister     Other (alive and well) Daughter         x3    Breast Cancer Self         rt  & lt       reports that she has been smoking cigarettes. She has a 2.1 pack-year smoking history. She has been exposed to tobacco smoke. She has never used smokeless tobacco. She reports current alcohol use of about 4.0 standard drinks of alcohol per week. She reports that she does not use drugs.    Allergies:  Allergies[1]    Medications:    Current  Facility-Administered Medications:     piperacillin-tazobactam (Zosyn) 4.5 g in dextrose 5% 100 mL IVPB-ADDV, 4.5 g, Intravenous, Once  Prescriptions Prior to Admission[2]    Review of Systems:  A ten point review of systems was negative except as noted.    Physical Exam:  Blood pressure 147/75, pulse 100, temperature 97.5 °F (36.4 °C), temperature source Temporal, resp. rate 19, SpO2 96%.    General: Alert, orientated x3.  Cooperative   mild discomfort     Lungs: per attending  Cardiac: per attending  Abdomen:  Soft, mild distention  tender LLQ  Skin  neg  Neurologic: per attending    Laboratory Data:  Lab Results   Component Value Date    WBC 14.7 (H) 11/27/2024    HGB 17.7 (H) 11/27/2024    HCT 50.5 (H) 11/27/2024    .0 11/27/2024    CREATSERUM 0.63 11/27/2024    BUN 7 (L) 11/27/2024     (L) 11/27/2024    K 4.6 11/27/2024     11/27/2024    CO2 24.0 11/27/2024     (H) 11/27/2024    CA 9.6 11/27/2024    ALB 4.6 11/27/2024    ALKPHO 108 11/27/2024    BILT 0.6 11/27/2024    TP 7.1 11/27/2024    AST 21 11/27/2024    ALT 13 11/27/2024    PTT 28.5 03/07/2024    INR 0.84 03/07/2024    T4F 1.0 08/07/2024    TSH 1.777 08/07/2024    LIP 36 11/27/2024       Imaging:  CT ABDOMEN+PELVIS(CONTRAST ONLY)(CPT=74177)    Result Date: 11/27/2024  PROCEDURE: CT ABDOMEN + PELVIS (CONTRAST ONLY) (CPT=74177)  COMPARISON: [facility], CTA ABDOMEN/PELVIS LOWER EXT BILAT W RUNOFF (GQG=93766), 3/07/2024, 3:31 PM.  INDICATIONS: urinary sx  TECHNIQUE: CT images of the abdomen and pelvis were obtained with non-ionic intravenous contrast material.  Automated exposure control for dose reduction was used. Adjustment of the mA and/or kV was done based on the patient's size. Use of iterative reconstruction technique for dose reduction was used.  Dose information is transmitted to the ACR (American College of Radiology) NRDR (National Radiology Data Registry) which includes the Dose Index Registry.  FINDINGS:   LOWER CHEST: There is mild atelectasis/scarring involving the left greater than right lower lobes.  The previously described left lower lobe atelectasis has resolved.  There is small airways disease with opacification of a few left subsegmental bronchi.  Triple-vessel coronary artery calcifications.  There is prominent pericardial fat.  There are postoperative changes from prior right mastectomy.  HEPATOBILIARY:   The liver is normal in size.  The liver is unchanged in density.  There is fatty infiltration adjacent to the falciform ligament (3:41).  No acute cholecystitis.  There is a 6 mm region of nodular gallbladder wall thickening at the fundus (3:49).  No biliary ductal dilatation.  SPLEEN:   No enlargement or focal lesion.   PANCREAS:   No lesion, fluid collection, ductal dilatation, or atrophy.   ADRENALS:   No mass or enlargement.   GENITOURINARY:   No renal calculus.  There are bilateral renal vascular calcifications.  No hydronephrosis.  No enhancing renal mass.  There are left upper pole renal cyst measuring 1.2 cm (3:  32).  There is left posterolateral bladder wall thickening measuring up to 10 mm in thickness (3:113).  No air in the urinary bladder.  GI TRACT:    There is acute diverticulitis involving the sigmoid colon in the pelvis.  There is mild circumferential bowel wall thickening in this region with adjacent fat stranding and fascial plane predominance.  There is a peripherally enhancing 0.5 x  1.6 x 2.3 cm collection that is contiguous with the undersurface of the sigmoid colon and the left vaginal cuff (3:118).  No bowel obstruction.  No acute appendicitis.  No abnormal small bowel wall thickening.  Small hiatal hernia.  PELVIC ORGANS: The uterus is surgically absent.  The peripherally enhancing lesion involving the vaginal cuff is described above.  AORTA/VASCULAR:   There are occlusions of the bilateral superficial femoral arteries (3:140).  There is atherosclerotic calcification of the  abdominal aorta and its branching vessels.  There is a 10 x 9 mm rightward directed saccular aneurysm arising from the infrarenal abdominal aorta (6:47).  RETROPERITONEUM:   No mass or enlarged adenopathy.   PERITONEUM: No pneumoperitoneum or ascites.  LYMPH NODES: No evidence of lymphadenopathy.   BONES:  The chronic left superior endplate compression fracture deformity at L3 is unchanged.  Multilevel degenerative changes of the lower thoracic and lumbar spine.  Multifocal degenerative change involving the osseous pelvis, which is most advanced at  the symphysis pubis.  No acute fracture.  The bones are demineralized.  OTHER:   Negative.           CONCLUSION:   1. Acute diverticulitis involving the sigmoid colon.  There is a peripherally enhancing collection extending from the undersurface of the sigmoid colon to the left vaginal cuff measuring 2.3 cm in maximal dimension, which may reflect a colovaginal fistula/abscess. 2. Left posterolateral bladder wall thickening measuring 10 mm in thickness, which may be reactive secondary to the adjacent acute diverticulitis, reflect cystitis, or less likely reflect an underlying bladder mass.  No air in the urinary bladder to suggest a colovesicular fistula. 3. No hydronephrosis or urinary calculus. 4. No bowel obstruction, acute appendicitis, or pneumoperitoneum.  5. Redemonstrated occlusions of the bilateral superficial femoral arteries. 6. A 10 x 9 mm rightward directed saccular aneurysm arising from the infrarenal abdominal aorta, which is not significantly changed when compared 03/07/2024. 7. A 6 mm region of fundal gallbladder wall thickening, which may reflect adenomyomatosis.  Consider nonemergent follow-up right upper quadrant ultrasound for further evaluation. 8. Small airways disease with opacification of a few left subsegmental bronchi.  The previously described left lower lobe collapsed has resolved. 9. Lesser incidental findings described above.     Dictated by  (CST): Tuan Minor MD on 11/27/2024 at 1:16 PM     Finalized by (CST): Tuan Minor MD on 11/27/2024 at 1:30 PM            Impression and Plan:  Patient Active Problem List   Diagnosis    History of right breast cancer    Hyperlipidemia    Atherosclerosis of arteries of extremities (HCC)    Atherosclerosis of both carotid arteries   14.7 wbc  17.7 wbc  134 na  Increase lipids    CT   sigmoid diverticulitis  small abscess  ? Vaginal fistula   Occlusion of  superficial femoral arteries   Small AAA\  Discussed with IR   to small to drain    Continue antibiotics    Clears    Advise vascular surgery consultation  EVELIN CANALES MD  11/27/2024  4:48 PM       [1] No Known Allergies  [2] (Not in a hospital admission)

## 2024-11-27 NOTE — ED PROVIDER NOTES
Patient Seen in: [facility] Emergency Department      History     Chief Complaint   Patient presents with    Urinary Symptoms     Stated Complaint: urinary sx    Subjective:   79-year-old female with history of breast cancer, hypertension, hemorrhoids, thrombocytosis here stating she has had some urinary symptoms a low abdominal pressure for 2 days.  It is worse if she sitting or lying.  She does not have any discomfort right now.  She has some slight dysuria and hesitancy and urgency.  No back pain nausea or fevers.  She said the last time she had infection she did have back pain with it.  No vomiting.  She is having bowel movements.  No cough or congestion.  No leg swelling.  No focal neurologic complaints.  No dizziness.              Objective:     Past Medical History:    Abnormal vaginal bleeding    At high risk for breast cancer    Breast cancer, left breast (HCC)    lumpectomy and RT    Breast cancer, right (HCC)    mastectomy and CMF    Essential hypertension    Hemorrhoids    History of breast cancer    History of lumpectomy    HTN (hypertension)    Musculoskeletal disorder    sciatic nerve problems    Osteoporosis screening    Status post mastectomy    Thrombocytosis    Uncontrolled hypertension              No pertinent past surgical history.              No pertinent social history.                Physical Exam     ED Triage Vitals [11/27/24 0957]   BP (!) 170/95   Pulse 108   Resp 20   Temp 97.5 °F (36.4 °C)   Temp src Temporal   SpO2 100 %   O2 Device None (Room air)       Current Vitals:   Vital Signs  BP: 147/75  Pulse: 100  Resp: 19  Temp: 97.5 °F (36.4 °C)  Temp src: Temporal  MAP (mmHg): 97    Oxygen Therapy  SpO2: 96 %  O2 Device: None (Room air)        Physical Exam  HENT:      Head: Normocephalic and atraumatic.      Right Ear: External ear normal.      Left Ear: External ear normal.      Nose: Nose normal.      Mouth/Throat:      Mouth: Mucous membranes are moist.   Eyes:       Extraocular Movements: Extraocular movements intact.      Pupils: Pupils are equal, round, and reactive to light.   Cardiovascular:      Rate and Rhythm: Regular rhythm.      Pulses: Normal pulses.      Heart sounds: Normal heart sounds.   Pulmonary:      Effort: Pulmonary effort is normal.      Breath sounds: Normal breath sounds.   Abdominal:      Palpations: Abdomen is soft.      Tenderness: There is no abdominal tenderness.   Musculoskeletal:         General: No tenderness or deformity. Normal range of motion.      Cervical back: Normal range of motion.   Skin:     General: Skin is warm and dry.   Neurological:      Mental Status: She is alert.      Sensory: No sensory deficit.      Motor: No weakness.             ED Course     Labs Reviewed   URINALYSIS WITH CULTURE REFLEX - Abnormal; Notable for the following components:       Result Value    Ketones Urine 10 (*)     Protein Urine 50 (*)     Urobilinogen Urine 3 (*)     Leukocyte Esterase Urine 75 (*)     Squamous Epi. Cells Few (*)     All other components within normal limits   BASIC METABOLIC PANEL (8) - Abnormal; Notable for the following components:    Glucose 104 (*)     Sodium 134 (*)     BUN 7 (*)     All other components within normal limits   CBC WITH DIFFERENTIAL WITH PLATELET - Abnormal; Notable for the following components:    WBC 14.7 (*)     RBC 5.48 (*)     HGB 17.7 (*)     HCT 50.5 (*)     Neutrophil Absolute Prelim 12.14 (*)     Neutrophil Absolute 12.14 (*)     Monocyte Absolute 1.20 (*)     All other components within normal limits   HEPATIC FUNCTION PANEL (7) - Normal   LIPASE - Normal   LACTIC ACID, PLASMA   URINE CULTURE, ROUTINE   BLOOD CULTURE   BLOOD CULTURE       ED Course as of 11/27/24 1719  ------------------------------------------------------------  Time: 11/27 1103  Comment: Labs and UA independently interpreted by me.  CBC did reveal a leukocytosis and high hemoglobin.  Urine showed some protein and leuk esterase but no  bacteria or pyuria.  Hepatic profile normal, lipase normal.  Will give IV fluids and add CT as there is no definitive UTI.  Patient's heart rate was in the 90s I will give IV fluids.  ------------------------------------------------------------  Time: 11/27 1104  Comment: When compared to labs 3 months ago.  The white blood count is a little higher as is the hemoglobin.  However she has had high hemoglobins in the past.  BMP revealed mild hyponatremia.  ------------------------------------------------------------  Time: 11/27 1607  Comment: CT independently interpreted by me.  Diverticulitis noted with possible abscess and fistula.  Discussed with Dr. Hernandez from surgery and as well as GI who will be on consult.  Patient was seen by Dr. Burns in the ER we will give Loly              [facility]      CT ABDOMEN+PELVIS(CONTRAST ONLY)(CPT=74177)    Result Date: 11/27/2024  CONCLUSION:   1. Acute diverticulitis involving the sigmoid colon.  There is a peripherally enhancing collection extending from the undersurface of the sigmoid colon to the left vaginal cuff measuring 2.3 cm in maximal dimension, which may reflect a colovaginal fistula/abscess. 2. Left posterolateral bladder wall thickening measuring 10 mm in thickness, which may be reactive secondary to the adjacent acute diverticulitis, reflect cystitis, or less likely reflect an underlying bladder mass.  No air in the urinary bladder to suggest a colovesicular fistula. 3. No hydronephrosis or urinary calculus. 4. No bowel obstruction, acute appendicitis, or pneumoperitoneum.  5. Redemonstrated occlusions of the bilateral superficial femoral arteries. 6. A 10 x 9 mm rightward directed saccular aneurysm arising from the infrarenal abdominal aorta, which is not significantly changed when compared 03/07/2024. 7. A 6 mm region of fundal gallbladder wall thickening, which may reflect adenomyomatosis.  Consider nonemergent follow-up right upper quadrant ultrasound for  further evaluation. 8. Small airways disease with opacification of a few left subsegmental bronchi.  The previously described left lower lobe collapsed has resolved. 9. Lesser incidental findings described above.     Dictated by (CST): Tuan Minor MD on 11/27/2024 at 1:16 PM     Finalized by (CST): Tuan Minor MD on 11/27/2024 at 1:30 PM             Admission disposition: 11/27/2024  4:52 PM           Medical Decision Making  79-year-old female here with lower abdominal pressure for a few days and some urinary symptoms.  No fever chills nausea or vomiting.  No cough or congestion.  Differential is vast could include but not limited to UTI, abscess, diverticulitis, appendicitis, bowel obstruction, proctitis, pyelonephritis and many other possibilities.  Labs and CT has been ordered.    Amount and/or Complexity of Data Reviewed  External Data Reviewed: labs and notes.  Labs: ordered. Decision-making details documented in ED Course.  Radiology: ordered and independent interpretation performed. Decision-making details documented in ED Course.  Discussion of management or test interpretation with external provider(s): Patient given fluids as well as Zosyn.  Discussed with GI and surgery.  Will keep n.p.o. for now.    Risk  Decision regarding hospitalization.  Risk Details: Breast cancer, hemorrhoids, hypertension        Disposition and Plan     Clinical Impression:  1. Acute diverticulitis         Disposition:  Admit  11/27/2024  4:52 pm    Follow-up:  No follow-up provider specified.  We recommend that you schedule follow up care with a primary care provider within the next three months to obtain basic health screening including reassessment of your blood pressure.      Medications Prescribed:  Current Discharge Medication List              Supplementary Documentation:         Hospital Problems       Present on Admission  Date Reviewed: 8/7/2024            ICD-10-CM Noted POA    Acute diverticulitis K57.92  11/27/2024 Unknown

## 2024-11-27 NOTE — CONSULTS
[facility]   Gastroenterology Consultation Note    Emily Rice  Patient Status:    Emergency  Date of Admission:         11/27/2024, Hospital day #0  Attending:   Eliel Chapman MD  PCP:     Kimberly Simons MD    Chief Complaint:  Abd pain     History of Present Illness:  Emily Rice is a a(n) 79 year old female w/ a history of breast cancer s/p mastectomy, lumpectomy and chemo/radiation therapy, HTN, who presents with suprapubic pressure. Denies nausea/vomiting, diarrhea, constipation, rectal bleeding. Denies dysuria, increased frequency of urination. In the ED, CT w/ acute diverticulitis of the sigmoid colon with 2.3 cm fluid collection extending from the undersurface of the sigmoid colon to the L vaginal cuff c/f colovaginal fistula vs abscess. She denies history of diverticulitis. No previous colonoscopy. No family history CRC.    History:  Past Medical History:    Abnormal vaginal bleeding    At high risk for breast cancer    Breast cancer, left breast (HCC)    lumpectomy and RT    Breast cancer, right (HCC)    mastectomy and CMF    Essential hypertension    Hemorrhoids    History of breast cancer    History of lumpectomy    HTN (hypertension)    Musculoskeletal disorder    sciatic nerve problems    Osteoporosis screening    Status post mastectomy    Thrombocytosis    Uncontrolled hypertension     Past Surgical History:   Procedure Laterality Date    Chemotherapy  1990    right breast ca    Hysterectomy  1979    States one ovary left in place.    Lumpectomy left  2010    Mastectomy right  1990    Radiation left  2010     Family History   Problem Relation Age of Onset    Other (Other) Father         Cirrhosis    Heart Disease Mother     Other (atrial fib) Sister     Other (bowel resection) Sister     Other (alive and well) Daughter         x3    Breast Cancer Self         rt 1990 & lt 2010      reports that she has been smoking cigarettes. She has a 2.1 pack-year smoking  history. She has been exposed to tobacco smoke. She has never used smokeless tobacco. She reports current alcohol use of about 4.0 standard drinks of alcohol per week. She reports that she does not use drugs.    Allergies:  Allergies[1]    Medications:    Current Facility-Administered Medications:     piperacillin-tazobactam (Zosyn) 4.5 g in dextrose 5% 100 mL IVPB-ADDV, 4.5 g, Intravenous, Once    Review of Systems:  CONSTITUTIONAL:  negative for fevers, rigors  EYES:  negative for diplopia   RESPIRATORY:  negative for severe shortness of breath  CARDIOVASCULAR:  negative for crushing sub-sternal chest pain  GASTROINTESTINAL:  see HPI  GENITOURINARY:  negative for dysuria or gross hematuria  INTEGUMENT/BREAST:  SKIN:  negative for jaundice   ALLERGIC/IMMUNOLOGIC:  negative for hay fever  ENDOCRINE:  negative for cold intolerance and heat intolerance  MUSCULOSKELETAL:  negative for joint effusion/severe erythema  BEHAVIOR/PSYCH:  negative for psychotic behavior    Physical Exam:    Blood pressure 147/75, pulse 100, temperature 97.5 °F (36.4 °C), temperature source Temporal, resp. rate 19, SpO2 96%. There is no height or weight on file to calculate BMI.    Gen: appears comfortable and in no acute distress  HEENT: sclera appear anicteric, mucus membranes appear moist  CV: the extremities are warm and well-perfused   Lung: no increased work of breathing, no conversational dyspnea   Abd: soft, mild lower abdominal tenderness without rigidity or guarding, non-distended, no masses palpated  Skin: no jaundice, no apparent rashes   Neuro: alert and interactive, no focal neuro deficits  Psych: cooperative, normal affect     Laboratory Data:  Lab Results   Component Value Date    WBC 14.7 11/27/2024    HGB 17.7 11/27/2024    HCT 50.5 11/27/2024    .0 11/27/2024    CREATSERUM 0.63 11/27/2024    BUN 7 11/27/2024     11/27/2024    K 4.6 11/27/2024     11/27/2024    CO2 24.0 11/27/2024     11/27/2024     CA 9.6 11/27/2024    ALB 4.6 11/27/2024    ALKPHO 108 11/27/2024    BILT 0.6 11/27/2024    TP 7.1 11/27/2024    AST 21 11/27/2024    ALT 13 11/27/2024    LIP 36 11/27/2024       Imaging:  CT ABDOMEN+PELVIS(CONTRAST ONLY)(CPT=74177)    Result Date: 11/27/2024  CONCLUSION:   1. Acute diverticulitis involving the sigmoid colon.  There is a peripherally enhancing collection extending from the undersurface of the sigmoid colon to the left vaginal cuff measuring 2.3 cm in maximal dimension, which may reflect a colovaginal fistula/abscess. 2. Left posterolateral bladder wall thickening measuring 10 mm in thickness, which may be reactive secondary to the adjacent acute diverticulitis, reflect cystitis, or less likely reflect an underlying bladder mass.  No air in the urinary bladder to suggest a colovesicular fistula. 3. No hydronephrosis or urinary calculus. 4. No bowel obstruction, acute appendicitis, or pneumoperitoneum.  5. Redemonstrated occlusions of the bilateral superficial femoral arteries. 6. A 10 x 9 mm rightward directed saccular aneurysm arising from the infrarenal abdominal aorta, which is not significantly changed when compared 03/07/2024. 7. A 6 mm region of fundal gallbladder wall thickening, which may reflect adenomyomatosis.  Consider nonemergent follow-up right upper quadrant ultrasound for further evaluation. 8. Small airways disease with opacification of a few left subsegmental bronchi.  The previously described left lower lobe collapsed has resolved. 9. Lesser incidental findings described above.     Dictated by (CST): Tuan Minor MD on 11/27/2024 at 1:16 PM     Finalized by (CST): Tuan Minor MD on 11/27/2024 at 1:30 PM           Assessment & Plan   Emily Rice is a 79 year old female w/ a history of breast cancer s/p mastectomy, lumpectomy and chemo/radiation therapy, HTN, who presents with acute diverticulitis. First episode of diverticulitis. CT w/ acute diverticulitis of the sigmoid  colon with 2.3 cm fluid collection extending from the undersurface of the sigmoid colon to the L vaginal cuff c/f colovaginal fistula vs abscess. Surgery consulted. On IV zosyn. No previous colonoscopy - will need as OP in 6-8 weeks once diverticulitis resolves.     Recommendations:  - Agree with surgical consultation.   - Cont IV zosyn.   - NPO for now pending surgical evaluation.   - OP colonoscopy in 6-8 weeks once acute diverticulitis resolves.    Lianne Elena MD         [1] No Known Allergies

## 2024-11-28 LAB
ANION GAP SERPL CALC-SCNC: 6 MMOL/L (ref 0–18)
BASOPHILS # BLD AUTO: 0.04 X10(3) UL (ref 0–0.2)
BASOPHILS NFR BLD AUTO: 0.4 %
BUN BLD-MCNC: 5 MG/DL (ref 9–23)
BUN/CREAT SERPL: 10.2 (ref 10–20)
CALCIUM BLD-MCNC: 9.3 MG/DL (ref 8.7–10.4)
CHLORIDE SERPL-SCNC: 108 MMOL/L (ref 98–112)
CO2 SERPL-SCNC: 24 MMOL/L (ref 21–32)
CREAT BLD-MCNC: 0.49 MG/DL
DEPRECATED RDW RBC AUTO: 42.5 FL (ref 35.1–46.3)
EGFRCR SERPLBLD CKD-EPI 2021: 96 ML/MIN/1.73M2 (ref 60–?)
EOSINOPHIL # BLD AUTO: 0.06 X10(3) UL (ref 0–0.7)
EOSINOPHIL NFR BLD AUTO: 0.6 %
ERYTHROCYTE [DISTWIDTH] IN BLOOD BY AUTOMATED COUNT: 12.7 % (ref 11–15)
GLUCOSE BLD-MCNC: 106 MG/DL (ref 70–99)
HCT VFR BLD AUTO: 43.4 %
HGB BLD-MCNC: 15.2 G/DL
IMM GRANULOCYTES # BLD AUTO: 0.04 X10(3) UL (ref 0–1)
IMM GRANULOCYTES NFR BLD: 0.4 %
LYMPHOCYTES # BLD AUTO: 1.03 X10(3) UL (ref 1–4)
LYMPHOCYTES NFR BLD AUTO: 10 %
MCH RBC QN AUTO: 32.1 PG (ref 26–34)
MCHC RBC AUTO-ENTMCNC: 35 G/DL (ref 31–37)
MCV RBC AUTO: 91.8 FL
MONOCYTES # BLD AUTO: 0.94 X10(3) UL (ref 0.1–1)
MONOCYTES NFR BLD AUTO: 9.1 %
NEUTROPHILS # BLD AUTO: 8.21 X10 (3) UL (ref 1.5–7.7)
NEUTROPHILS # BLD AUTO: 8.21 X10(3) UL (ref 1.5–7.7)
NEUTROPHILS NFR BLD AUTO: 79.5 %
OSMOLALITY SERPL CALC.SUM OF ELEC: 284 MOSM/KG (ref 275–295)
PLATELET # BLD AUTO: 352 10(3)UL (ref 150–450)
POTASSIUM SERPL-SCNC: 3.6 MMOL/L (ref 3.5–5.1)
RBC # BLD AUTO: 4.73 X10(6)UL
SODIUM SERPL-SCNC: 138 MMOL/L (ref 136–145)
WBC # BLD AUTO: 10.3 X10(3) UL (ref 4–11)

## 2024-11-28 PROCEDURE — 99232 SBSQ HOSP IP/OBS MODERATE 35: CPT | Performed by: INTERNAL MEDICINE

## 2024-11-28 PROCEDURE — 99232 SBSQ HOSP IP/OBS MODERATE 35: CPT | Performed by: SURGERY

## 2024-11-28 PROCEDURE — 99233 SBSQ HOSP IP/OBS HIGH 50: CPT | Performed by: HOSPITALIST

## 2024-11-28 RX ORDER — CLOPIDOGREL BISULFATE 75 MG/1
75 TABLET ORAL DAILY
Status: DISCONTINUED | OUTPATIENT
Start: 2024-11-28 | End: 2024-11-29

## 2024-11-28 RX ORDER — ASPIRIN 81 MG/1
81 TABLET ORAL DAILY
Status: DISCONTINUED | OUTPATIENT
Start: 2024-11-28 | End: 2024-11-29

## 2024-11-28 NOTE — PROGRESS NOTES
[facility]  part of [facility]    Progress Note    Emily Rice Patient Status:  Inpatient    9/3/1945 MRN [id number]   Location [facility] 5SW/SE Attending Shanice Carty MD   Hosp Day # 1 PCP Kimberly Simons MD     Chief Complaint: Abdominal pain    SUBJECTIVE:    No acute events overnight.  Patient denies chest pain, sob.  No fevers/chills.  No n/v/abd pain.  Feeling better.    10 ROS completed and otherwise negative.    OBJECTIVE:  Vital signs in last 24 hours:  /66 (BP Location: Left arm)   Pulse 92   Temp 98 °F (36.7 °C) (Oral)   Resp 20   Wt 128 lb (58.1 kg)   LMP  (LMP Unknown)   SpO2 94%   BMI 20.66 kg/m²     Intake/Output:    Intake/Output Summary (Last 24 hours) at 2024 1323  Last data filed at 2024 1011  Gross per 24 hour   Intake 0 ml   Output --   Net 0 ml       Wt Readings from Last 3 Encounters:   24 128 lb (58.1 kg)   24 137 lb 4 oz (62.3 kg)   24 138 lb (62.6 kg)       Exam     Gen: No acute distress  Pulm: Lungs clear, normal respiratory effort  CV: Heart with regular rate and rhythm  Abd: Abdomen soft, nontender, bowel sounds present  Neuro: No acute focal deficits  MSK: moves extremities  Skin: Warm and dry  Psych: Normal affect  Ext: no c/c/e    Data Review:     Labs:   Lab Results   Component Value Date    WBC 10.3 2024    HGB 15.2 2024    HCT 43.4 2024    .0 2024    CREATSERUM 0.49 2024    BUN 5 2024     2024    K 3.6 2024     2024    CO2 24.0 2024     2024    CA 9.3 2024       Imaging: Reviewed  CT ABDOMEN+PELVIS(CONTRAST ONLY)(CPT=74177)    Result Date: 2024  CONCLUSION:   1. Acute diverticulitis involving the sigmoid colon.  There is a peripherally enhancing collection extending from the undersurface of the sigmoid colon to the left vaginal cuff measuring 2.3 cm in maximal dimension, which may  reflect a colovaginal fistula/abscess. 2. Left posterolateral bladder wall thickening measuring 10 mm in thickness, which may be reactive secondary to the adjacent acute diverticulitis, reflect cystitis, or less likely reflect an underlying bladder mass.  No air in the urinary bladder to suggest a colovesicular fistula. 3. No hydronephrosis or urinary calculus. 4. No bowel obstruction, acute appendicitis, or pneumoperitoneum.  5. Redemonstrated occlusions of the bilateral superficial femoral arteries. 6. A 10 x 9 mm rightward directed saccular aneurysm arising from the infrarenal abdominal aorta, which is not significantly changed when compared 03/07/2024. 7. A 6 mm region of fundal gallbladder wall thickening, which may reflect adenomyomatosis.  Consider nonemergent follow-up right upper quadrant ultrasound for further evaluation. 8. Small airways disease with opacification of a few left subsegmental bronchi.  The previously described left lower lobe collapsed has resolved. 9. Lesser incidental findings described above.     Dictated by (CST): Tuan Minor MD on 11/27/2024 at 1:16 PM     Finalized by (CST): Tuan Minor MD on 11/27/2024 at 1:30 PM           Meds:   Current Facility-Administered Medications   Medication Dose Route Frequency    piperacillin-tazobactam (Zosyn) 3.375 g in dextrose 5% 100 mL IVPB-ADDV  3.375 g Intravenous Q8H    nicotine (Nicoderm CQ) 14 MG/24HR patch 1 patch  1 patch Transdermal Daily PRN    acetaminophen (Tylenol Extra Strength) tab 500 mg  500 mg Oral Q4H PRN    ondansetron (Zofran) 4 MG/2ML injection 4 mg  4 mg Intravenous Q6H PRN    metoclopramide (Reglan) 5 mg/mL injection 10 mg  10 mg Intravenous Q8H PRN    morphINE PF 2 MG/ML injection 1 mg  1 mg Intravenous Q2H PRN    Or    morphINE PF 2 MG/ML injection 2 mg  2 mg Intravenous Q2H PRN    Or    morphINE PF 4 MG/ML injection 4 mg  4 mg Intravenous Q2H PRN    temazepam (Restoril) cap 15 mg  15 mg Oral Nightly PRN    potassium  chloride 10 mEq in dextrose 5%-sodium chloride 0.45% 1000mL infusion premix   Intravenous Continuous    amLODIPine (Norvasc) tab 10 mg  10 mg Oral Daily    losartan (Cozaar) tab 100 mg  100 mg Oral Daily         Assessment  Patient Active Problem List   Diagnosis    History of right breast cancer    Hyperlipidemia    Atherosclerosis of arteries of extremities (HCC)    Atherosclerosis of both carotid arteries    Acute diverticulitis    Perforation of sigmoid colon due to diverticulitis       Plan:     Acute sigmoid colon diverticulitis with inferior pericolonic abscess versus colovaginal fistula  - Patient denies any vaginal discharge  - Seen by surgery, no surgical intervention at this time  - Seen by GI  - Tolerating clear liquid diet, will advance to full liquid diet  - Continue IV antibiotics    Peripheral arterial disease  Bilateral SFA occlusion, small infrarenal saccular aortic aneurysm  - seen by vascular surgery, follow-up as outpatient     Hypertension  - Stable    Global A/P  - reviewed labs and vitals from today  - reviewed notes of the day  - cbc, bmp, mag ordered for tomorrow  - discussed need to stay in the hospital today due to above  - cont IV antibiotics  - discussed with patient/RN and care team  - dispo pending clinical course      Shanice Carty MD  11/28/2024  1:23 PM    Supplementary Documentation:   DVT Mechanical Prophylaxis:   SCDs,    DVT Pharmacologic Prophylaxis   Medication   None                Code Status: Not on file  Tena: No urinary catheter in place  Tena Duration (in days):   Central line:    LIZET:         **Certification      PHYSICIAN Certification of Need for Inpatient Hospitalization - Initial Certification    Patient will require inpatient services that will reasonably be expected to span two midnight's based on the clinical documentation in H+P.   Based on patients current state of illness, I anticipate that, after discharge, patient will require TBD.                  MDM: High complexity-acute illness posing a threat to life. IV meds requiring close inpatient monitoring. I personally spent time on chart/note review, review of labs/imaging, discussion with patient, physical exam, discussion with staff, writing progress note, and discussion of plan of care.

## 2024-11-28 NOTE — PROGRESS NOTES
[facility]     Gastroenterology Progress Note    Emily Rice Patient Status:  Inpatient    9/3/1945 MRN [id number]   Location [facility] 5SW/SE Attending Shanice Carty MD   Hosp Day # 1 PCP Kimberly Simons MD       Assessment and Plan:   Acute sigmoid diverticulitis with complication/abscess-doing much better today on IV antibiotics, she reports the pain is dramatically improved.  She still is on bowel rest.  IV fluids.  Discussed diverticular disease/diverticulitis with the patient, treatment options reviewed.  Surgery is following.    Recommend:  -Continue IV antibiotics  -Input from radiology if drainage is needed of the abscess/fistula area  -Consideration for repeat scan pending clinical course  -Colonoscopy as an outpatient in 8 weeks        Subjective:   Pain much improved, sitting up in the chair today.  Appears comfortable.  No diarrhea.  No blood per rectum.    Objective:   Patient Vitals for the past 24 hrs:   BP Temp Temp src Pulse Resp SpO2 Weight   24 1357 130/68 97.4 °F (36.3 °C) Oral 111 18 95 % --   24 0838 142/66 98 °F (36.7 °C) Oral 92 20 94 % --   24 0448 138/74 98.1 °F (36.7 °C) Oral 96 20 96 % --   24 2032 137/61 97.7 °F (36.5 °C) Oral 92 20 95 % --   24 1931 -- -- -- 92 -- -- --   24 1801 -- -- -- -- -- -- 128 lb (58.1 kg)   24 1758 151/76 97.7 °F (36.5 °C) Oral 110 18 96 % --   24 1730 148/73 -- -- 89 19 -- --     Body mass index is 20.66 kg/m².    Gen- Patient appears comfortable and in no acute distress  HEENT:Negative for scleral icterus.  Mucous membranes are moist.  CV- regular rate and rhythm   Lung- Clear to auscultation bilaterally  Abdomen-Non-distended.  Bowel sounds are present.  There is no tenderness to palpation.  There are no masses appreciated.  Liver and spleen are not palpable.  Skin- No jaundice.  Warm and dry.  Ext: No cyanosis, clubbing or edema is evident.   Neuro- Alert and  interactive, and gross movements of extremities normal  Affect-Normal      Results:     Recent Labs   Lab 11/27/24  1028 11/28/24  0448   RBC 5.48* 4.73   HGB 17.7* 15.2   HCT 50.5* 43.4   MCV 92.2 91.8   MCH 32.3 32.1   MCHC 35.0 35.0   RDW 12.7 12.7   NEPRELIM 12.14* 8.21*   WBC 14.7* 10.3   .0 352.0         Recent Labs   Lab 11/27/24  1028 11/28/24  0448   * 106*   BUN 7* 5*   CREATSERUM 0.63 0.49*   CA 9.6 9.3   ALB 4.6  --    * 138   K 4.6 3.6    108   CO2 24.0 24.0   ALKPHO 108  --    AST 21  --    ALT 13  --    BILT 0.6  --    TP 7.1  --        Lab Results   Component Value Date    INR 0.84 03/07/2024       CT ABDOMEN+PELVIS(CONTRAST ONLY)(CPT=74177)    Result Date: 11/27/2024  CONCLUSION:   1. Acute diverticulitis involving the sigmoid colon.  There is a peripherally enhancing collection extending from the undersurface of the sigmoid colon to the left vaginal cuff measuring 2.3 cm in maximal dimension, which may reflect a colovaginal fistula/abscess. 2. Left posterolateral bladder wall thickening measuring 10 mm in thickness, which may be reactive secondary to the adjacent acute diverticulitis, reflect cystitis, or less likely reflect an underlying bladder mass.  No air in the urinary bladder to suggest a colovesicular fistula. 3. No hydronephrosis or urinary calculus. 4. No bowel obstruction, acute appendicitis, or pneumoperitoneum.  5. Redemonstrated occlusions of the bilateral superficial femoral arteries. 6. A 10 x 9 mm rightward directed saccular aneurysm arising from the infrarenal abdominal aorta, which is not significantly changed when compared 03/07/2024. 7. A 6 mm region of fundal gallbladder wall thickening, which may reflect adenomyomatosis.  Consider nonemergent follow-up right upper quadrant ultrasound for further evaluation. 8. Small airways disease with opacification of a few left subsegmental bronchi.  The previously described left lower lobe collapsed has resolved.  9. Lesser incidental findings described above.     Dictated by (CST): Tuan Minor MD on 11/27/2024 at 1:16 PM     Finalized by (CST): Tuan Minor MD on 11/27/2024 at 1:30 PM                 Jose Herrera MD  11/28/2024

## 2024-11-28 NOTE — PROGRESS NOTES
[facility]  Progress Note    Emily Rice Patient Status:  Inpatient    9/3/1945 MRN [id number]   Location [facility] 5SW/SE Attending Shanice Carty MD   Hosp Day # 1 PCP Kimberly Simons MD     Subjective:  Pt seen laying in bed. Minimal abdominal pain. Some bloating. No nausea or vomiting. No fever or chills    Objective/Physical Exam:  General: Alert, orientated x3.  Cooperative.  No apparent distress.  Vital Signs:  Blood pressure 142/66, pulse 92, temperature 98 °F (36.7 °C), temperature source Oral, resp. rate 20, weight 128 lb (58.1 kg), SpO2 94%.  Wt Readings from Last 3 Encounters:   24 128 lb (58.1 kg)   24 137 lb 4 oz (62.3 kg)   24 138 lb (62.6 kg)     Lungs: No respiratory distress.  Cardiac: Regular rate and rhythm.   Abdomen:  Soft, mild distended, minimal tender, with no rebound or guarding.  No peritoneal signs.   Extremities:  No lower extremity edema noted.      Intake/Output:    Intake/Output Summary (Last 24 hours) at 2024 1100  Last data filed at 2024 1011  Gross per 24 hour   Intake 0 ml   Output --   Net 0 ml     No intake/output data recorded.  No intake/output data recorded.    Medications:    piperacillin-tazobactam  3.375 g Intravenous Q8H    amLODIPine  10 mg Oral Daily    losartan  100 mg Oral Daily       Labs:  Lab Results   Component Value Date    WBC 10.3 2024    HGB 15.2 2024    HCT 43.4 2024    .0 2024     Lab Results   Component Value Date     2024    K 3.6 2024     2024    CO2 24.0 2024    BUN 5 2024    CREATSERUM 0.49 2024     2024    CA 9.3 2024     Lab Results   Component Value Date    INR 0.84 2024         Assessment  Patient Active Problem List   Diagnosis    History of right breast cancer    Hyperlipidemia    Atherosclerosis of arteries of extremities (HCC)    Atherosclerosis of both carotid arteries     Acute diverticulitis    Perforation of sigmoid colon due to diverticulitis     Perforated sigmoid diverticulitis with possible colovaginal fistula    Plan:  No acute surgical intervention indicated at this time  Continue clear liquids as tolerated  Continue IV abx  Serial imaging as needed  Ambulate and up to chair      Quality:  DVT Mechanical Prophylaxis:   SCDs,    DVT Pharmacologic Prophylaxis   Medication   None                Code Status: Not on file  Tena: No urinary catheter in place  Tena Duration (in days):   Central line:    LIZET:         Tuan Gold PA-C  11/28/2024  11:00 AM    Addendum:    Pt seen and examined.  I agree with Tuan Gold'sALOK note.  Advance die as tolerated.    Stewart Avilez MD

## 2024-11-28 NOTE — PLAN OF CARE
Problem: Patient Centered Care  Goal: Patient preferences are identified and integrated in the patient's plan of care  Description: Interventions:  - What would you like us to know as we care for you? I am from home  - Provide timely, complete, and accurate information to patient/family  - Incorporate patient and family knowledge, values, beliefs, and cultural backgrounds into the planning and delivery of care  - Encourage patient/family to participate in care and decision-making at the level they choose  - Honor patient and family perspectives and choices  Outcome: Progressing     Problem: Patient/Family Goals  Goal: Patient/Family Long Term Goal  Description: Patient's Long Term Goal: feel better    Interventions:  - follow md poc  - See additional Care Plan goals for specific interventions  Outcome: Progressing  Goal: Patient/Family Short Term Goal  Description: Patient's Short Term Goal: no pain    Interventions:   - follow md poc  - See additional Care Plan goals for specific interventions  Outcome: Progressing     Problem: GASTROINTESTINAL - ADULT  Goal: Maintains or returns to baseline bowel function  Description: INTERVENTIONS:  - Assess bowel function  - Maintain adequate hydration with IV or PO as ordered and tolerated  - Evaluate effectiveness of GI medications  - Encourage mobilization and activity  - Obtain nutritional consult as needed  - Establish a toileting routine/schedule  - Consider collaborating with pharmacy to review patient's medication profile  Outcome: Progressing     Problem: PAIN - ADULT  Goal: Verbalizes/displays adequate comfort level or patient's stated pain goal  Description: INTERVENTIONS:  - Encourage pt to monitor pain and request assistance  - Assess pain using appropriate pain scale  - Administer analgesics based on type and severity of pain and evaluate response  - Implement non-pharmacological measures as appropriate and evaluate response  - Consider cultural and social  influences on pain and pain management  - Manage/alleviate anxiety  - Utilize distraction and/or relaxation techniques  - Monitor for opioid side effects  - Notify MD/LIP if interventions unsuccessful or patient reports new pain  - Anticipate increased pain with activity and pre-medicate as appropriate  Outcome: Progressing     Problem: RISK FOR INFECTION - ADULT  Goal: Absence of fever/infection during anticipated neutropenic period  Description: INTERVENTIONS  - Monitor WBC  - Administer growth factors as ordered  - Implement neutropenic guidelines  Outcome: Progressing     Problem: SAFETY ADULT - FALL  Goal: Free from fall injury  Description: INTERVENTIONS:  - Assess pt frequently for physical needs  - Identify cognitive and physical deficits and behaviors that affect risk of falls.  - [place] fall precautions as indicated by assessment.  - Educate pt/family on patient safety including physical limitations  - Instruct pt to call for assistance with activity based on assessment  - Modify environment to reduce risk of injury  - Provide assistive devices as appropriate  - Consider OT/PT consult to assist with strengthening/mobility  - Encourage toileting schedule  Outcome: Progressing     Problem: DISCHARGE PLANNING  Goal: Discharge to home or other facility with appropriate resources  Description: INTERVENTIONS:  - Identify barriers to discharge w/pt and caregiver  - Include patient/family/discharge partner in discharge planning  - Arrange for needed discharge resources and transportation as appropriate  - Identify discharge learning needs (meds, wound care, etc)  - Arrange for interpreters to assist at discharge as needed  - Consider post-discharge preferences of patient/family/discharge partner  - Complete POLST form as appropriate  - Assess patient's ability to be responsible for managing their own health  - Refer to Case Management Department for coordinating discharge planning if the patient needs  post-hospital services based on physician/LIP order or complex needs related to functional status, cognitive ability or social support system  Outcome: Progressing     Problem: CARDIOVASCULAR - ADULT  Goal: Maintains optimal cardiac output and hemodynamic stability  Description: INTERVENTIONS:  - Monitor vital signs, rhythm, and trends  - Monitor for bleeding, hypotension and signs of decreased cardiac output  - Evaluate effectiveness of vasoactive medications to optimize hemodynamic stability  - Monitor arterial and/or venous puncture sites for bleeding and/or hematoma  - Assess quality of pulses, skin color and temperature  - Assess for signs of decreased coronary artery perfusion - ex. Angina  - Evaluate fluid balance, assess for edema, trend weights  Outcome: Progressing  Goal: Absence of cardiac arrhythmias or at baseline  Description: INTERVENTIONS:  - Continuous cardiac monitoring, monitor vital signs, obtain 12 lead EKG if indicated  - Evaluate effectiveness of antiarrhythmic and heart rate control medications as ordered  - Initiate emergency measures for life threatening arrhythmias  - Monitor electrolytes and administer replacement therapy as ordered  Outcome: Progressing     Problem: METABOLIC/FLUID AND ELECTROLYTES - ADULT  Goal: Electrolytes maintained within normal limits  Description: INTERVENTIONS:  - Monitor labs and rhythm and assess patient for signs and symptoms of electrolyte imbalances  - Administer electrolyte replacement as ordered  - Monitor response to electrolyte replacements, including rhythm and repeat lab results as appropriate  - Fluid restriction as ordered  - Instruct patient on fluid and nutrition restrictions as appropriate  Outcome: Progressing  Goal: Hemodynamic stability and optimal renal function maintained  Description: INTERVENTIONS:  - Monitor labs and assess for signs and symptoms of volume excess or deficit  - Monitor intake, output and patient weight  - Monitor urine  specific gravity, serum osmolarity and serum sodium as indicated or ordered  - Monitor response to interventions for patient's volume status, including labs, urine output, blood pressure (other measures as available)  - Encourage oral intake as appropriate  - Instruct patient on fluid and nutrition restrictions as appropriate  Outcome: Progressing

## 2024-11-28 NOTE — CONSULTS
[facility]  part of [facility]    Vascular Surgery Consultation    Emily SABINO Rice Patient Status:  Inpatient    9/3/1945 MRN [id number]   Location [facility] 5SW/SE Attending Shanice Carty MD   Hosp Day # 1 PCP Kimberly Simons MD     Date of Admission:  2024  Date of Consult: 2024    “I was requested by Dr. Carty to provide a consultation for Emily E Dwayne  Reason for Consultation:   Bilateral SFA occlusion, small infrarenal saccular aortic aneurysm    History of Present Illness:   Patient is a 79 year old female with past medical history of hypertension, PAD, hyperlipidemia and thrombocytosis who was admitted to the hospital with abdominal pain and CT scan concerning for Acute diverticulitis.  Patient was previously visited by my partner Dr. Najjar regarding the left blue toe syndrome and acute on chronic left SFA occlusion.  At the time she was noticed to have no symptoms related to her PAD, so it was decided to follow the patient as an outpatient.  She presented to the emergency room yesterday with an acute diverticulitis for which she ultimately got admitted.  CT scan of abdomen pelvis showed a previously known bilateral SFA occlusion and a small infrarenal saccular aneurysm.  Vascular surgery was consulted for further evaluation and recommendations.  Visiting patient at the bedside, she denies having any long or short distance claudication.  She does not have rest pain.  She does not have tissue loss.  She has been very active lately.  She has been compatible with her aspirin and Plavix.    Past Medical History  Past Medical History:    Abnormal vaginal bleeding    At high risk for breast cancer    Breast cancer, left breast (HCC)    lumpectomy and RT    Breast cancer, right (HCC)    mastectomy and CMF    Essential hypertension    Hemorrhoids    History of breast cancer    History of lumpectomy    HTN (hypertension)    Musculoskeletal disorder     sciatic nerve problems    Osteoporosis screening    Status post mastectomy    Thrombocytosis    Uncontrolled hypertension       Past Surgical History  Past Surgical History:   Procedure Laterality Date    Chemotherapy  1990    right breast ca    Hysterectomy  1979    States one ovary left in place.    Lumpectomy left  2010    Mastectomy right  1990    Radiation left  2010       Family History  Family History   Problem Relation Age of Onset    Other (Other) Father         Cirrhosis    Heart Disease Mother     Other (atrial fib) Sister     Other (bowel resection) Sister     Other (alive and well) Daughter         x3    Breast Cancer Self         rt 1990 & lt 2010       Social History  Social History     Socioeconomic History    Marital status:     Number of children: 3   Occupational History    Occupation: retired      Comment: office work    Tobacco Use    Smoking status: Every Day     Current packs/day: 0.04     Average packs/day: (2.1 ttl pk-yrs)     Types: Cigarettes     Passive exposure: Current    Smokeless tobacco: Never    Tobacco comments:     About 4 cigarrettes a day   Vaping Use    Vaping status: Never Used   Substance and Sexual Activity    Alcohol use: Yes     Alcohol/week: 4.0 standard drinks of alcohol     Types: 4 Cans of beer per week     Comment: occasionally    Drug use: No   Other Topics Concern     Service No    Caffeine Concern Yes     Comment: Coffee 3 cups daily, Soda    Occupational Exposure No     Social Drivers of Health     Food Insecurity: No Food Insecurity (11/27/2024)    Food Insecurity     Food Insecurity: Never true   Transportation Needs: No Transportation Needs (11/27/2024)    Transportation Needs     Lack of Transportation: No   Housing Stability: Low Risk  (11/27/2024)    Housing Stability     Housing Instability: No        Current Medications:  Current Facility-Administered Medications   Medication Dose Route Frequency    piperacillin-tazobactam (Zosyn) 3.375 g  in dextrose 5% 100 mL IVPB-ADDV  3.375 g Intravenous Q8H    nicotine (Nicoderm CQ) 14 MG/24HR patch 1 patch  1 patch Transdermal Daily PRN    acetaminophen (Tylenol Extra Strength) tab 500 mg  500 mg Oral Q4H PRN    ondansetron (Zofran) 4 MG/2ML injection 4 mg  4 mg Intravenous Q6H PRN    metoclopramide (Reglan) 5 mg/mL injection 10 mg  10 mg Intravenous Q8H PRN    morphINE PF 2 MG/ML injection 1 mg  1 mg Intravenous Q2H PRN    Or    morphINE PF 2 MG/ML injection 2 mg  2 mg Intravenous Q2H PRN    Or    morphINE PF 4 MG/ML injection 4 mg  4 mg Intravenous Q2H PRN    temazepam (Restoril) cap 15 mg  15 mg Oral Nightly PRN    potassium chloride 10 mEq in dextrose 5%-sodium chloride 0.45% 1000mL infusion premix   Intravenous Continuous    amLODIPine (Norvasc) tab 10 mg  10 mg Oral Daily    losartan (Cozaar) tab 100 mg  100 mg Oral Daily     Medications Prior to Admission   Medication Sig    CLOPIDOGREL 75 MG Oral Tab TAKE 1 TABLET(75 MG) BY MOUTH DAILY    losartan 100 MG Oral Tab Take 1 tablet (100 mg total) by mouth daily.    amLODIPine 10 MG Oral Tab Take 1 tablet (10 mg total) by mouth daily.    cholecalciferol 50 MCG (2000 UT) Oral Cap Take 1 capsule (2,000 Units total) by mouth daily.    atorvastatin 40 MG Oral Tab Take 1 tablet (40 mg total) by mouth nightly. (Patient not taking: Reported on 11/27/2024)       Allergies  Allergies[1]    Review of Systems:   Constitutional: No fevers, chills, fatigue or night sweats.  ENT: No neck pain, running nose, headaches.  Skin: No rashes, pruritus or skin changes,  Respiratory: No coughing, phlegm or wheezing.  CV: Denies chest pain, palpitations, orthopnea, or dizziness.  Musculoskeletal: No joint pain, stiffness or swelling.  GI: No nausea, vomiting.  No diarrhea.  Denies any abdominal pain or unintentional weight loss.  No food fear.  No postprandial abdominal pain.   Neurologic: No seizures, tremors, weakness or numbness.    Physical Exam:   Blood pressure 142/66, pulse  92, temperature 98 °F (36.7 °C), temperature source Oral, resp. rate 20, weight 128 lb (58.1 kg), SpO2 94%.    General: NAD  Neck: No JVD  Lungs: CTA bilat  Heart: RRR  Abdomen: Soft, no tenderness or sign of peritonitis.  No rigidity or self guarding.    Extremities: Warm, dry, no LE edema bilat  Vascular: 2+ bilat Femoral pulses.  Feet are warm.  No pulses are palpable.  Skin: no rashes or legions noted  Neurological:  AAOx3, MAEW    Hemodynamic parameters (last 24 hours):      Medications:     Scheduled Meds:    piperacillin-tazobactam  3.375 g Intravenous Q8H    amLODIPine  10 mg Oral Daily    losartan  100 mg Oral Daily       Continuous Infusions:    potassium chloride in dextrose 5%-sodium chloride 0.45% 83 mL/hr at 11/27/24 1925         Results:     Laboratory Data:  Lab Results   Component Value Date    WBC 10.3 11/28/2024    HGB 15.2 11/28/2024    HCT 43.4 11/28/2024    .0 11/28/2024    CREATSERUM 0.49 (L) 11/28/2024    BUN 5 (L) 11/28/2024     11/28/2024    K 3.6 11/28/2024     11/28/2024    CO2 24.0 11/28/2024     (H) 11/28/2024    CA 9.3 11/28/2024    ALB 4.6 11/27/2024    ALKPHO 108 11/27/2024    TP 7.1 11/27/2024    AST 21 11/27/2024    ALT 13 11/27/2024    PTT 28.5 03/07/2024    INR 0.84 03/07/2024    PTP 12.1 03/07/2024    T4F 1.0 08/07/2024    TSH 1.777 08/07/2024    LIP 36 11/27/2024         Imaging:  CT ABDOMEN+PELVIS(CONTRAST ONLY)(CPT=74177)  Narrative:   PROCEDURE: CT ABDOMEN + PELVIS (CONTRAST ONLY) (CPT=74177)     COMPARISON: [facility], CTA ABDOMEN/PELVIS LOWER EXT BILAT W RUNOFF (BWT=07325), 3/07/2024, 3:31 PM.     INDICATIONS: urinary sx     TECHNIQUE: CT images of the abdomen and pelvis were obtained with non-ionic intravenous contrast material.  Automated exposure control for dose reduction was used. Adjustment of the mA and/or kV was done based on the patient's size. Use of iterative   reconstruction technique for dose reduction was used.  Dose  information is transmitted to the ACR (American College of Radiology) NRDR (National Radiology Data Registry) which includes the Dose Index Registry.     FINDINGS:   LOWER CHEST: There is mild atelectasis/scarring involving the left greater than right lower lobes.  The previously described left lower lobe atelectasis has resolved.  There is small airways disease with opacification of a few left subsegmental bronchi.    Triple-vessel coronary artery calcifications.  There is prominent pericardial fat.  There are postoperative changes from prior right mastectomy.     HEPATOBILIARY: The liver is normal in size.  The liver is unchanged in density.  There is fatty infiltration adjacent to the falciform ligament (3:41).  No acute cholecystitis.  There is a 6 mm region of nodular gallbladder wall thickening at the   fundus (3:49).  No biliary ductal dilatation.     SPLEEN: No enlargement or focal lesion.       PANCREAS: No lesion, fluid collection, ductal dilatation, or atrophy.       ADRENALS: No mass or enlargement.       GENITOURINARY: No renal calculus.  There are bilateral renal vascular calcifications.  No hydronephrosis.  No enhancing renal mass.  There are left upper pole renal cyst measuring 1.2 cm (3:  32).  There is left posterolateral bladder wall thickening   measuring up to 10 mm in thickness (3:113).  No air in the urinary bladder.     GI TRACT: There is acute diverticulitis involving the sigmoid colon in the pelvis.  There is mild circumferential bowel wall thickening in this region with adjacent fat stranding and fascial plane predominance.  There is a peripherally enhancing 0.5 x   1.6 x 2.3 cm collection that is contiguous with the undersurface of the sigmoid colon and the left vaginal cuff (3:118).  No bowel obstruction.  No acute appendicitis.  No abnormal small bowel wall thickening.  Small hiatal hernia.     PELVIC ORGANS: The uterus is surgically absent.  The peripherally enhancing lesion involving  the vaginal cuff is described above.     AORTA/VASCULAR: There are occlusions of the bilateral superficial femoral arteries (3:140).  There is atherosclerotic calcification of the abdominal aorta and its branching vessels.  There is a 10 x 9 mm rightward directed saccular aneurysm arising   from the infrarenal abdominal aorta (6:47).     RETROPERITONEUM: No mass or enlarged adenopathy.       PERITONEUM: No pneumoperitoneum or ascites.     LYMPH NODES: No evidence of lymphadenopathy.       BONES: The chronic left superior endplate compression fracture deformity at L3 is unchanged.  Multilevel degenerative changes of the lower thoracic and lumbar spine.  Multifocal degenerative change involving the osseous pelvis, which is most advanced at   the symphysis pubis.  No acute fracture.  The bones are demineralized.     OTHER: Negative.                   Impression:   CONCLUSION:      1. Acute diverticulitis involving the sigmoid colon.  There is a peripherally enhancing collection extending from the undersurface of the sigmoid colon to the left vaginal cuff measuring 2.3 cm in maximal dimension, which may reflect a colovaginal   fistula/abscess.  2. Left posterolateral bladder wall thickening measuring 10 mm in thickness, which may be reactive secondary to the adjacent acute diverticulitis, reflect cystitis, or less likely reflect an underlying bladder mass.  No air in the urinary bladder to   suggest a colovesicular fistula.  3. No hydronephrosis or urinary calculus.  4. No bowel obstruction, acute appendicitis, or pneumoperitoneum.    5. Redemonstrated occlusions of the bilateral superficial femoral arteries.  6. A 10 x 9 mm rightward directed saccular aneurysm arising from the infrarenal abdominal aorta, which is not significantly changed when compared 03/07/2024.  7. A 6 mm region of fundal gallbladder wall thickening, which may reflect adenomyomatosis.  Consider nonemergent follow-up right upper quadrant ultrasound  for further evaluation.  8. Small airways disease with opacification of a few left subsegmental bronchi.  The previously described left lower lobe collapsed has resolved.  9. Lesser incidental findings described above.             Dictated by (CST): Tuan Minor MD on 11/27/2024 at 1:16 PM       Finalized by (CST): Tuan Minor MD on 11/27/2024 at 1:30 PM                 Impression:   79 year old female with past medical history of HTN, PAD, HLP and thrombocytosis who was admitted to the hospital with abdominal pain and CT scan concerning for Acute diverticulitis.Patient was previously visited by my partner Dr. Najjar regarding left blue toe syndrome and acute on chronic left SFA occlusion.  At the time she was noticed to have no symptoms related to her PAD, so it was decided to follow the patient as an outpatient.  She continues to be asymptomatic from PAD standpoint.  She does not have any short or long distance claudication, rest pain or tissue loss.  She has been compatible with her aspirin and Plavix.  Regarding her infrarenal saccular aortic aneurysm, the aneurysm is only 1 mm larger if anything.    Recommendations:  -No surgical intervention indicated at this time.  Please continue patient's Plavix and aspirin.  - We are going to continue follow her up as an outpatient.  -Please call with questions or concerns.    Thank you for allowing me to participate in the care of your patient.    Sim Pino MD  11/28/2024  [facility], Division of Vascular Surgery    Please note: Dragon speech recognition software was used to prepare this note. If a word or phrase is confusing, it is likely do to a failure of recognition.   Please contact me with any questions or clarifications.       [1] No Known Allergies

## 2024-11-28 NOTE — PLAN OF CARE
Problem: Patient Centered Care  Goal: Patient preferences are identified and integrated in the patient's plan of care  Description: Interventions:  - What would you like us to know as we care for you? I am from home  - Provide timely, complete, and accurate information to patient/family  - Incorporate patient and family knowledge, values, beliefs, and cultural backgrounds into the planning and delivery of care  - Encourage patient/family to participate in care and decision-making at the level they choose  - Honor patient and family perspectives and choices  Outcome: Progressing     Problem: Patient/Family Goals  Goal: Patient/Family Long Term Goal  Description: Patient's Long Term Goal: feel better    Interventions:  - follow md poc  - See additional Care Plan goals for specific interventions  Outcome: Progressing  Goal: Patient/Family Short Term Goal  Description: Patient's Short Term Goal: no pain    Interventions:   - follow md poc  - See additional Care Plan goals for specific interventions  Outcome: Progressing     Problem: GASTROINTESTINAL - ADULT  Goal: Maintains or returns to baseline bowel function  Description: INTERVENTIONS:  - Assess bowel function  - Maintain adequate hydration with IV or PO as ordered and tolerated  - Evaluate effectiveness of GI medications  - Encourage mobilization and activity  - Obtain nutritional consult as needed  - Establish a toileting routine/schedule  - Consider collaborating with pharmacy to review patient's medication profile  Outcome: Progressing

## 2024-11-28 NOTE — PLAN OF CARE
Problem: Patient Centered Care  Goal: Patient preferences are identified and integrated in the patient's plan of care  Description: Interventions:  - What would you like us to know as we care for you? I am from home  - Provide timely, complete, and accurate information to patient/family  - Incorporate patient and family knowledge, values, beliefs, and cultural backgrounds into the planning and delivery of care  - Encourage patient/family to participate in care and decision-making at the level they choose  - Honor patient and family perspectives and choices  Outcome: Progressing     Problem: Patient/Family Goals  Goal: Patient/Family Long Term Goal  Description: Patient's Long Term Goal: feel better    Interventions:  - follow md poc  - See additional Care Plan goals for specific interventions  Outcome: Progressing  Goal: Patient/Family Short Term Goal  Description: Patient's Short Term Goal: no pain    Interventions:   - follow md poc  - See additional Care Plan goals for specific interventions  Outcome: Progressing     Problem: GASTROINTESTINAL - ADULT  Goal: Maintains or returns to baseline bowel function  Description: INTERVENTIONS:  - Assess bowel function  - Maintain adequate hydration with IV or PO as ordered and tolerated  - Evaluate effectiveness of GI medications  - Encourage mobilization and activity  - Obtain nutritional consult as needed  - Establish a toileting routine/schedule  - Consider collaborating with pharmacy to review patient's medication profile  Outcome: Progressing     Problem: PAIN - ADULT  Goal: Verbalizes/displays adequate comfort level or patient's stated pain goal  Description: INTERVENTIONS:  - Encourage pt to monitor pain and request assistance  - Assess pain using appropriate pain scale  - Administer analgesics based on type and severity of pain and evaluate response  - Implement non-pharmacological measures as appropriate and evaluate response  - Consider cultural and social  influences on pain and pain management  - Manage/alleviate anxiety  - Utilize distraction and/or relaxation techniques  - Monitor for opioid side effects  - Notify MD/LIP if interventions unsuccessful or patient reports new pain  - Anticipate increased pain with activity and pre-medicate as appropriate  Outcome: Progressing     Problem: RISK FOR INFECTION - ADULT  Goal: Absence of fever/infection during anticipated neutropenic period  Description: INTERVENTIONS  - Monitor WBC  - Administer growth factors as ordered  - Implement neutropenic guidelines  Outcome: Progressing     Problem: SAFETY ADULT - FALL  Goal: Free from fall injury  Description: INTERVENTIONS:  - Assess pt frequently for physical needs  - Identify cognitive and physical deficits and behaviors that affect risk of falls.  - [place] fall precautions as indicated by assessment.  - Educate pt/family on patient safety including physical limitations  - Instruct pt to call for assistance with activity based on assessment  - Modify environment to reduce risk of injury  - Provide assistive devices as appropriate  - Consider OT/PT consult to assist with strengthening/mobility  - Encourage toileting schedule  Outcome: Progressing     Problem: DISCHARGE PLANNING  Goal: Discharge to home or other facility with appropriate resources  Description: INTERVENTIONS:  - Identify barriers to discharge w/pt and caregiver  - Include patient/family/discharge partner in discharge planning  - Arrange for needed discharge resources and transportation as appropriate  - Identify discharge learning needs (meds, wound care, etc)  - Arrange for interpreters to assist at discharge as needed  - Consider post-discharge preferences of patient/family/discharge partner  - Complete POLST form as appropriate  - Assess patient's ability to be responsible for managing their own health  - Refer to Case Management Department for coordinating discharge planning if the patient needs  post-hospital services based on physician/LIP order or complex needs related to functional status, cognitive ability or social support system  Outcome: Progressing     Problem: CARDIOVASCULAR - ADULT  Goal: Maintains optimal cardiac output and hemodynamic stability  Description: INTERVENTIONS:  - Monitor vital signs, rhythm, and trends  - Monitor for bleeding, hypotension and signs of decreased cardiac output  - Evaluate effectiveness of vasoactive medications to optimize hemodynamic stability  - Monitor arterial and/or venous puncture sites for bleeding and/or hematoma  - Assess quality of pulses, skin color and temperature  - Assess for signs of decreased coronary artery perfusion - ex. Angina  - Evaluate fluid balance, assess for edema, trend weights  Outcome: Progressing  Goal: Absence of cardiac arrhythmias or at baseline  Description: INTERVENTIONS:  - Continuous cardiac monitoring, monitor vital signs, obtain 12 lead EKG if indicated  - Evaluate effectiveness of antiarrhythmic and heart rate control medications as ordered  - Initiate emergency measures for life threatening arrhythmias  - Monitor electrolytes and administer replacement therapy as ordered  Outcome: Progressing     Problem: METABOLIC/FLUID AND ELECTROLYTES - ADULT  Goal: Electrolytes maintained within normal limits  Description: INTERVENTIONS:  - Monitor labs and rhythm and assess patient for signs and symptoms of electrolyte imbalances  - Administer electrolyte replacement as ordered  - Monitor response to electrolyte replacements, including rhythm and repeat lab results as appropriate  - Fluid restriction as ordered  - Instruct patient on fluid and nutrition restrictions as appropriate  Outcome: Progressing  Goal: Hemodynamic stability and optimal renal function maintained  Description: INTERVENTIONS:  - Monitor labs and assess for signs and symptoms of volume excess or deficit  - Monitor intake, output and patient weight  - Monitor urine  specific gravity, serum osmolarity and serum sodium as indicated or ordered  - Monitor response to interventions for patient's volume status, including labs, urine output, blood pressure (other measures as available)  - Encourage oral intake as appropriate  - Instruct patient on fluid and nutrition restrictions as appropriate  Outcome: Progressing

## 2024-11-29 ENCOUNTER — TELEPHONE (OUTPATIENT)
Facility: CLINIC | Age: 79
End: 2024-11-29

## 2024-11-29 VITALS
BODY MASS INDEX: 21 KG/M2 | OXYGEN SATURATION: 94 % | SYSTOLIC BLOOD PRESSURE: 129 MMHG | DIASTOLIC BLOOD PRESSURE: 69 MMHG | HEART RATE: 91 BPM | WEIGHT: 128 LBS | RESPIRATION RATE: 18 BRPM | TEMPERATURE: 98 F

## 2024-11-29 LAB
ANION GAP SERPL CALC-SCNC: 7 MMOL/L (ref 0–18)
BASOPHILS # BLD AUTO: 0.05 X10(3) UL (ref 0–0.2)
BASOPHILS NFR BLD AUTO: 0.5 %
BUN BLD-MCNC: <5 MG/DL (ref 9–23)
CALCIUM BLD-MCNC: 9.2 MG/DL (ref 8.7–10.4)
CHLORIDE SERPL-SCNC: 107 MMOL/L (ref 98–112)
CO2 SERPL-SCNC: 23 MMOL/L (ref 21–32)
CREAT BLD-MCNC: 0.54 MG/DL
DEPRECATED RDW RBC AUTO: 43.3 FL (ref 35.1–46.3)
EGFRCR SERPLBLD CKD-EPI 2021: 94 ML/MIN/1.73M2 (ref 60–?)
EOSINOPHIL # BLD AUTO: 0.12 X10(3) UL (ref 0–0.7)
EOSINOPHIL NFR BLD AUTO: 1.3 %
ERYTHROCYTE [DISTWIDTH] IN BLOOD BY AUTOMATED COUNT: 12.7 % (ref 11–15)
GLUCOSE BLD-MCNC: 103 MG/DL (ref 70–99)
HCT VFR BLD AUTO: 42.9 %
HGB BLD-MCNC: 15.6 G/DL
IMM GRANULOCYTES # BLD AUTO: 0.04 X10(3) UL (ref 0–1)
IMM GRANULOCYTES NFR BLD: 0.4 %
LYMPHOCYTES # BLD AUTO: 1.72 X10(3) UL (ref 1–4)
LYMPHOCYTES NFR BLD AUTO: 18.9 %
MAGNESIUM SERPL-MCNC: 1.9 MG/DL (ref 1.6–2.6)
MCH RBC QN AUTO: 33.5 PG (ref 26–34)
MCHC RBC AUTO-ENTMCNC: 36.4 G/DL (ref 31–37)
MCV RBC AUTO: 92.3 FL
MONOCYTES # BLD AUTO: 0.88 X10(3) UL (ref 0.1–1)
MONOCYTES NFR BLD AUTO: 9.7 %
NEUTROPHILS # BLD AUTO: 6.29 X10 (3) UL (ref 1.5–7.7)
NEUTROPHILS # BLD AUTO: 6.29 X10(3) UL (ref 1.5–7.7)
NEUTROPHILS NFR BLD AUTO: 69.2 %
PLATELET # BLD AUTO: 362 10(3)UL (ref 150–450)
POTASSIUM SERPL-SCNC: 3.8 MMOL/L (ref 3.5–5.1)
RBC # BLD AUTO: 4.65 X10(6)UL
SODIUM SERPL-SCNC: 137 MMOL/L (ref 136–145)
WBC # BLD AUTO: 9.1 X10(3) UL (ref 4–11)

## 2024-11-29 PROCEDURE — 99232 SBSQ HOSP IP/OBS MODERATE 35: CPT | Performed by: INTERNAL MEDICINE

## 2024-11-29 PROCEDURE — 99232 SBSQ HOSP IP/OBS MODERATE 35: CPT | Performed by: SURGERY

## 2024-11-29 PROCEDURE — 99239 HOSP IP/OBS DSCHRG MGMT >30: CPT | Performed by: HOSPITALIST

## 2024-11-29 RX ORDER — ASPIRIN 81 MG/1
81 TABLET ORAL DAILY
Qty: 30 TABLET | Refills: 0 | Status: SHIPPED | OUTPATIENT
Start: 2024-11-30 | End: 2024-12-30

## 2024-11-29 NOTE — DISCHARGE SUMMARY
[facility]  part of [facility]     Discharge Summary    Emily Rice Patient Status:  Inpatient    9/3/1945 MRN [id number]   Location [facility] 5SW/SE Attending Bonnie Parra MD   Hosp Day # 2 PCP Kimberly Simons MD     Date of Admission: 2024    Date of Discharge: 2024    Admitting Diagnosis: Acute diverticulitis [K57.92]    Discharge Diagnosis:   Patient Active Problem List   Diagnosis    History of right breast cancer    Hyperlipidemia    Atherosclerosis of arteries of extremities (HCC)    Atherosclerosis of both carotid arteries    Acute diverticulitis    Perforation of sigmoid colon due to diverticulitis       Reason for Admission:     Acute diverticulitis    Physical Exam:     General: Patient is alert and oriented x3 does not appear to be in acute distress at this time  HEENT: EOMI PERRLA, atraumatic normocephalic  Cardiac: S1-S2 appreciated  Lungs: Good air entry bilaterally clear to auscultation  Abdomen: Soft nontender nondistended positive bowel sounds  Ext: Peripheral pulses are positive  Neuro: No focal deficits noted  Psych: Normal mood  Skin: No rashes noted  MSK: Full range of motion intact      Hospital Course:     Acute Diverticulitis   Acute sigmoid colon diverticulitis with inferior pericolonic abscess versus colovaginal fistula  - Patient denies any vaginal discharge  - Seen by surgery, no surgical intervention at this time  - Seen by GI  - Tolerating clear liquid diet, will advance to full liquid diet  - Continue IV antibiotics     Peripheral arterial disease  Bilateral SFA occlusion, small infrarenal saccular aortic aneurysm  - seen by vascular surgery, follow-up as outpatient       Hypertension  - Stable     Global A/P  - reviewed labs and vitals from today  - reviewed notes of the day  - cbc, bmp, mag ordered for tomorrow  - discussed need to stay in the hospital today due to above  - cont IV antibiotics  - discussed with patient/RN and  care team  - dispo pending clinical course    History of Present Illness:     Per admitting:   Patient is a 79-year-old  female who came into the emergency department for evaluation of left lower quadrant abdominal pain for the last 2 to 3 days. CBC showed white blood cell count of 14.7 with left shift. Chemistry and liver function tests were unremarkable. Urinalysis showed possible urinary tract infection. CT scan of the abdomen and pelvis showed acute diverticulitis involving the sigmoid colon with peripherally enhancing collection extending from the undersurface of the sigmoid colon to the left vaginal cuff, measuring 2.3 cm in maximal dimension, which may reflect colovaginal fistula or abscess. Left posterolateral bladder wall thickening measuring 10 mm in thickness, could be reactive secondary to adjacent acute diverticulitis or less likely reflect an underlying bladder mass. No other acute findings. Patient was started on IV antibiotics, and she will be admitted to the hospital for further management.     Disposition: Home or Self Care    Discharge Condition: Stable    Discharge Medications:   Current Discharge Medication List        START taking these medications    Details   amoxicillin clavulanate 875-125 MG Oral Tab Take 1 tablet by mouth 2 (two) times daily for 14 days.  Qty: 28 tablet, Refills: 0      aspirin 81 MG Oral Tab EC Take 1 tablet (81 mg total) by mouth daily.  Qty: 30 tablet, Refills: 0           CONTINUE these medications which have NOT CHANGED    Details   CLOPIDOGREL 75 MG Oral Tab TAKE 1 TABLET(75 MG) BY MOUTH DAILY  Qty: 90 tablet, Refills: 1      losartan 100 MG Oral Tab Take 1 tablet (100 mg total) by mouth daily.  Qty: 90 tablet, Refills: 1      amLODIPine 10 MG Oral Tab Take 1 tablet (10 mg total) by mouth daily.  Qty: 90 tablet, Refills: 3      cholecalciferol 50 MCG (2000 UT) Oral Cap Take 1 capsule (2,000 Units total) by mouth daily.      atorvastatin 40 MG Oral Tab Take 1  tablet (40 mg total) by mouth nightly.  Qty: 90 tablet, Refills: 3    Associated Diagnoses: Atherosclerosis of both carotid arteries; Atherosclerosis of arteries of extremities (HCC)             Total dc time > 30 min    Bonnie Parra MD  11/29/2024  12:28 PM     Hospital Discharge Diagnoses:  Acute diverticulitis.    Lace+ Score: 64  59-90 High Risk  29-58 Medium Risk  0-28   Low Risk.    TCM Follow-Up Recommendation:  LACE > 58: High Risk of readmission after discharge from the hospital.

## 2024-11-29 NOTE — PROGRESS NOTES
[facility]  Progress Note    Emily Rice Patient Status:  Inpatient    9/3/1945 MRN [id number]   Location [facility] 5SW/SE Attending Bonnie Parra MD   Hosp Day # 2 PCP Kimberly Simons MD     Subjective:  The patient was seen and examined at bedside. She states she is feeling \"great\" today. She denies abdominal pain. She is tolerating a soft diet. She denies nausea or vomiting. She is passing flatus.    Objective/Physical Exam:  General: Alert, orientated x3.  Cooperative.  No apparent distress.  Vital Signs:  Blood pressure 129/69, pulse 91, temperature 97.5 °F (36.4 °C), temperature source Oral, resp. rate 18, weight 128 lb (58.1 kg), SpO2 94%.  Wt Readings from Last 3 Encounters:   24 128 lb (58.1 kg)   24 137 lb 4 oz (62.3 kg)   24 138 lb (62.6 kg)     Lungs: No respiratory distress.  Cardiac: Regular rate and rhythm.   Abdomen:  Soft, non distended, non tender, with no rebound or guarding.  No peritoneal signs.   Extremities:  No lower extremity edema noted.    Drain: none    Intake/Output:    Intake/Output Summary (Last 24 hours) at 2024 0954  Last data filed at 2024 0935  Gross per 24 hour   Intake 2034.5 ml   Output --   Net 2034.5 ml     I/O last 3 completed shifts:  In: 1914.5 [P.O.:860; I.V.:954.5; IV PIGGYBACK:100]  Out: -   I/O this shift:  In: 120 [P.O.:120]  Out: -     Medications:    clopidogrel  75 mg Oral Daily    aspirin  81 mg Oral Daily    piperacillin-tazobactam  3.375 g Intravenous Q8H    amLODIPine  10 mg Oral Daily    losartan  100 mg Oral Daily       Labs:  Lab Results   Component Value Date    WBC 9.1 2024    HGB 15.6 2024    HCT 42.9 2024    .0 2024     Lab Results   Component Value Date     2024    K 3.8 2024     2024    CO2 23.0 2024    BUN <5 2024    CREATSERUM 0.54 2024     2024    CA 9.2 2024     Lab Results   Component  Value Date    INR 0.84 03/07/2024         Assessment  Patient Active Problem List   Diagnosis    History of right breast cancer    Hyperlipidemia    Atherosclerosis of arteries of extremities (HCC)    Atherosclerosis of both carotid arteries    Acute diverticulitis    Perforation of sigmoid colon due to diverticulitis     Perforated sigmoid diverticulitis with possible colovaginal fistula      Plan:  The patient may discharge home from a general surgical standpoint once cleared by other services  Continue soft diet for 2-3 weeks once discharged   Follow up with GI for colonoscopy in 6-8 weeks  Will transition to oral antibiotics at discharge   Ambulate and up to chair  Medical management per primary  DVT prophylaxis with SCDs, plavix and aspirin  Follow up with Dr. Hernandez in 2 weeks     Quality:  DVT Mechanical Prophylaxis:   SCDs,    DVT Pharmacologic Prophylaxis   Medication   None         DVT Pharmacologic prophylaxis: Aspirin 81 mg      Code Status: Not on file  Tena: No urinary catheter in place  Tena Duration (in days):   Central line:    LIZET: 12/1/2024        Michell Hernandez PA-C  11/29/2024  9:54 AM    Addendum:    Pt seen and examined.  I agree with Michell Hernandez PA-C  note.    Stewart Avilez MD

## 2024-11-29 NOTE — PROGRESS NOTES
[facility]     Gastroenterology Progress Note    Emily Rice Patient Status:  Inpatient    9/3/1945 MRN [id number]   Location [facility] 5SW/SE Attending Shanice Carty MD   Hosp Day # 2 PCP Kimberly Simons MD       Assessment and Plan:   Acute sigmoid diverticulitis with complication/abscess-doing much better today on IV antibiotics, she reports the pain is gone.  Tolerating low fiber diet.  Discussed diverticular disease/diverticulitis with the patient, treatment options reviewed.  Surgery is following.    Recommend:  -Transition IV antibiotics to orals   -Consideration for repeat scan pending clinical course  -Colonoscopy as an outpatient in 8 weeks    Subjective:   Pain gone. Appears comfortable.  No diarrhea.  No blood per rectum. Wants to go home.     Objective:   Patient Vitals for the past 24 hrs:   BP Temp Temp src Pulse Resp SpO2 Weight   24 1357 130/68 97.4 °F (36.3 °C) Oral 111 18 95 % --   24 0838 142/66 98 °F (36.7 °C) Oral 92 20 94 % --   24 0448 138/74 98.1 °F (36.7 °C) Oral 96 20 96 % --   24 2032 137/61 97.7 °F (36.5 °C) Oral 92 20 95 % --   24 1931 -- -- -- 92 -- -- --   24 1801 -- -- -- -- -- -- 128 lb (58.1 kg)   24 1758 151/76 97.7 °F (36.5 °C) Oral 110 18 96 % --   24 1730 148/73 -- -- 89 19 -- --     Body mass index is 20.66 kg/m².    Gen- Patient appears comfortable and in no acute distress  HEENT:Negative for scleral icterus.  Mucous membranes are moist.  CV- regular rate and rhythm   Lung- Clear to auscultation bilaterally  Abdomen-Non-distended.  Bowel sounds are present.  There is no tenderness to palpation.  There are no masses appreciated.  Liver and spleen are not palpable.  Skin- No jaundice.  Warm and dry.  Ext: No cyanosis, clubbing or edema is evident.   Neuro- Alert and interactive, and gross movements of extremities normal  Affect-Normal      Results:     Recent Labs   Lab  11/27/24  1028 11/28/24  0448 11/29/24  0500   RBC 5.48* 4.73 4.65   HGB 17.7* 15.2 15.6   HCT 50.5* 43.4 42.9   MCV 92.2 91.8 92.3   MCH 32.3 32.1 33.5   MCHC 35.0 35.0 36.4   RDW 12.7 12.7 12.7   NEPRELIM 12.14* 8.21* 6.29   WBC 14.7* 10.3 9.1   .0 352.0 362.0         Recent Labs   Lab 11/27/24  1028 11/28/24  0448 11/29/24  0500   * 106* 103*   BUN 7* 5* <5*   CREATSERUM 0.63 0.49* 0.54*   CA 9.6 9.3 9.2   ALB 4.6  --   --    * 138 137   K 4.6 3.6 3.8    108 107   CO2 24.0 24.0 23.0   ALKPHO 108  --   --    AST 21  --   --    ALT 13  --   --    BILT 0.6  --   --    TP 7.1  --   --        Lab Results   Component Value Date    INR 0.84 03/07/2024       CT ABDOMEN+PELVIS(CONTRAST ONLY)(CPT=74177)    Result Date: 11/27/2024  CONCLUSION:   1. Acute diverticulitis involving the sigmoid colon.  There is a peripherally enhancing collection extending from the undersurface of the sigmoid colon to the left vaginal cuff measuring 2.3 cm in maximal dimension, which may reflect a colovaginal fistula/abscess. 2. Left posterolateral bladder wall thickening measuring 10 mm in thickness, which may be reactive secondary to the adjacent acute diverticulitis, reflect cystitis, or less likely reflect an underlying bladder mass.  No air in the urinary bladder to suggest a colovesicular fistula. 3. No hydronephrosis or urinary calculus. 4. No bowel obstruction, acute appendicitis, or pneumoperitoneum.  5. Redemonstrated occlusions of the bilateral superficial femoral arteries. 6. A 10 x 9 mm rightward directed saccular aneurysm arising from the infrarenal abdominal aorta, which is not significantly changed when compared 03/07/2024. 7. A 6 mm region of fundal gallbladder wall thickening, which may reflect adenomyomatosis.  Consider nonemergent follow-up right upper quadrant ultrasound for further evaluation. 8. Small airways disease with opacification of a few left subsegmental bronchi.  The previously described  left lower lobe collapsed has resolved. 9. Lesser incidental findings described above.     Dictated by (CST): Tuan Minor MD on 11/27/2024 at 1:16 PM     Finalized by (CST): Tuan Minor MD on 11/27/2024 at 1:30 PM                 Jose Herrera MD  11/28/2024

## 2024-11-29 NOTE — TELEPHONE ENCOUNTER
RN to set up appointment in GI clinic with ROBERTO for follow up diverticulitis.  Will need colonoscopy in 8 weeks.

## 2024-11-29 NOTE — PLAN OF CARE
Problem: Patient Centered Care  Goal: Patient preferences are identified and integrated in the patient's plan of care  Description: Interventions:  - What would you like us to know as we care for you? I am from home  - Provide timely, complete, and accurate information to patient/family  - Incorporate patient and family knowledge, values, beliefs, and cultural backgrounds into the planning and delivery of care  - Encourage patient/family to participate in care and decision-making at the level they choose  - Honor patient and family perspectives and choices  Outcome: Progressing     Problem: Patient/Family Goals  Goal: Patient/Family Long Term Goal  Description: Patient's Long Term Goal: feel better    Interventions:  - follow md poc  - See additional Care Plan goals for specific interventions  Outcome: Progressing  Goal: Patient/Family Short Term Goal  Description: Patient's Short Term Goal: no pain    Interventions:   - follow md poc  - See additional Care Plan goals for specific interventions  Outcome: Progressing     Problem: GASTROINTESTINAL - ADULT  Goal: Maintains or returns to baseline bowel function  Description: INTERVENTIONS:  - Assess bowel function  - Maintain adequate hydration with IV or PO as ordered and tolerated  - Evaluate effectiveness of GI medications  - Encourage mobilization and activity  - Obtain nutritional consult as needed  - Establish a toileting routine/schedule  - Consider collaborating with pharmacy to review patient's medication profile  Outcome: Progressing     Problem: PAIN - ADULT  Goal: Verbalizes/displays adequate comfort level or patient's stated pain goal  Description: INTERVENTIONS:  - Encourage pt to monitor pain and request assistance  - Assess pain using appropriate pain scale  - Administer analgesics based on type and severity of pain and evaluate response  - Implement non-pharmacological measures as appropriate and evaluate response  - Consider cultural and social  influences on pain and pain management  - Manage/alleviate anxiety  - Utilize distraction and/or relaxation techniques  - Monitor for opioid side effects  - Notify MD/LIP if interventions unsuccessful or patient reports new pain  - Anticipate increased pain with activity and pre-medicate as appropriate  Outcome: Progressing     Problem: RISK FOR INFECTION - ADULT  Goal: Absence of fever/infection during anticipated neutropenic period  Description: INTERVENTIONS  - Monitor WBC  - Administer growth factors as ordered  - Implement neutropenic guidelines  Outcome: Progressing     Problem: SAFETY ADULT - FALL  Goal: Free from fall injury  Description: INTERVENTIONS:  - Assess pt frequently for physical needs  - Identify cognitive and physical deficits and behaviors that affect risk of falls.  - [place] fall precautions as indicated by assessment.  - Educate pt/family on patient safety including physical limitations  - Instruct pt to call for assistance with activity based on assessment  - Modify environment to reduce risk of injury  - Provide assistive devices as appropriate  - Consider OT/PT consult to assist with strengthening/mobility  - Encourage toileting schedule  Outcome: Progressing     Problem: DISCHARGE PLANNING  Goal: Discharge to home or other facility with appropriate resources  Description: INTERVENTIONS:  - Identify barriers to discharge w/pt and caregiver  - Include patient/family/discharge partner in discharge planning  - Arrange for needed discharge resources and transportation as appropriate  - Identify discharge learning needs (meds, wound care, etc)  - Arrange for interpreters to assist at discharge as needed  - Consider post-discharge preferences of patient/family/discharge partner  - Complete POLST form as appropriate  - Assess patient's ability to be responsible for managing their own health  - Refer to Case Management Department for coordinating discharge planning if the patient needs  post-hospital services based on physician/LIP order or complex needs related to functional status, cognitive ability or social support system  Outcome: Progressing     Problem: CARDIOVASCULAR - ADULT  Goal: Maintains optimal cardiac output and hemodynamic stability  Description: INTERVENTIONS:  - Monitor vital signs, rhythm, and trends  - Monitor for bleeding, hypotension and signs of decreased cardiac output  - Evaluate effectiveness of vasoactive medications to optimize hemodynamic stability  - Monitor arterial and/or venous puncture sites for bleeding and/or hematoma  - Assess quality of pulses, skin color and temperature  - Assess for signs of decreased coronary artery perfusion - ex. Angina  - Evaluate fluid balance, assess for edema, trend weights  Outcome: Progressing  Goal: Absence of cardiac arrhythmias or at baseline  Description: INTERVENTIONS:  - Continuous cardiac monitoring, monitor vital signs, obtain 12 lead EKG if indicated  - Evaluate effectiveness of antiarrhythmic and heart rate control medications as ordered  - Initiate emergency measures for life threatening arrhythmias  - Monitor electrolytes and administer replacement therapy as ordered  Outcome: Progressing     Problem: METABOLIC/FLUID AND ELECTROLYTES - ADULT  Goal: Electrolytes maintained within normal limits  Description: INTERVENTIONS:  - Monitor labs and rhythm and assess patient for signs and symptoms of electrolyte imbalances  - Administer electrolyte replacement as ordered  - Monitor response to electrolyte replacements, including rhythm and repeat lab results as appropriate  - Fluid restriction as ordered  - Instruct patient on fluid and nutrition restrictions as appropriate  Outcome: Progressing  Goal: Hemodynamic stability and optimal renal function maintained  Description: INTERVENTIONS:  - Monitor labs and assess for signs and symptoms of volume excess or deficit  - Monitor intake, output and patient weight  - Monitor urine  specific gravity, serum osmolarity and serum sodium as indicated or ordered  - Monitor response to interventions for patient's volume status, including labs, urine output, blood pressure (other measures as available)  - Encourage oral intake as appropriate  - Instruct patient on fluid and nutrition restrictions as appropriate  Outcome: Progressing   No acute changes in patient status, vital signs stable, call light within reach, continue iv Zosyn and IV fluids,

## 2024-11-29 NOTE — PLAN OF CARE
Orders received for patient discharge. Patient transported home via Car with family. All discharge instructions were discussed with patient and family. Patient verbalizes understanding and agreeable to plan of care & follow-up plan as outlined in discharge summary and had no further questions regarding discharge instruction. IV taken out.  All patient belongings were transported with patient at time of discharge.  Problem: Patient Centered Care  Goal: Patient preferences are identified and integrated in the patient's plan of care  Description: Interventions:  - What would you like us to know as we care for you? I am from home  - Provide timely, complete, and accurate information to patient/family  - Incorporate patient and family knowledge, values, beliefs, and cultural backgrounds into the planning and delivery of care  - Encourage patient/family to participate in care and decision-making at the level they choose  - Honor patient and family perspectives and choices  Outcome: Adequate for Discharge     Problem: Patient/Family Goals  Goal: Patient/Family Long Term Goal  Description: Patient's Long Term Goal: feel better    Interventions:  - follow md poc  - See additional Care Plan goals for specific interventions  Outcome: Adequate for Discharge  Goal: Patient/Family Short Term Goal  Description: Patient's Short Term Goal: no pain    Interventions:   - follow md poc  - See additional Care Plan goals for specific interventions  Outcome: Adequate for Discharge     Problem: GASTROINTESTINAL - ADULT  Goal: Maintains or returns to baseline bowel function  Description: INTERVENTIONS:  - Assess bowel function  - Maintain adequate hydration with IV or PO as ordered and tolerated  - Evaluate effectiveness of GI medications  - Encourage mobilization and activity  - Obtain nutritional consult as needed  - Establish a toileting routine/schedule  - Consider collaborating with pharmacy to review patient's medication  profile  Outcome: Adequate for Discharge     Problem: CARDIOVASCULAR - ADULT  Goal: Maintains optimal cardiac output and hemodynamic stability  Description: INTERVENTIONS:  - Monitor vital signs, rhythm, and trends  - Monitor for bleeding, hypotension and signs of decreased cardiac output  - Evaluate effectiveness of vasoactive medications to optimize hemodynamic stability  - Monitor arterial and/or venous puncture sites for bleeding and/or hematoma  - Assess quality of pulses, skin color and temperature  - Assess for signs of decreased coronary artery perfusion - ex. Angina  - Evaluate fluid balance, assess for edema, trend weights  Outcome: Adequate for Discharge  Goal: Absence of cardiac arrhythmias or at baseline  Description: INTERVENTIONS:  - Continuous cardiac monitoring, monitor vital signs, obtain 12 lead EKG if indicated  - Evaluate effectiveness of antiarrhythmic and heart rate control medications as ordered  - Initiate emergency measures for life threatening arrhythmias  - Monitor electrolytes and administer replacement therapy as ordered  Outcome: Adequate for Discharge     Problem: METABOLIC/FLUID AND ELECTROLYTES - ADULT  Goal: Electrolytes maintained within normal limits  Description: INTERVENTIONS:  - Monitor labs and rhythm and assess patient for signs and symptoms of electrolyte imbalances  - Administer electrolyte replacement as ordered  - Monitor response to electrolyte replacements, including rhythm and repeat lab results as appropriate  - Fluid restriction as ordered  - Instruct patient on fluid and nutrition restrictions as appropriate  Outcome: Adequate for Discharge  Goal: Hemodynamic stability and optimal renal function maintained  Description: INTERVENTIONS:  - Monitor labs and assess for signs and symptoms of volume excess or deficit  - Monitor intake, output and patient weight  - Monitor urine specific gravity, serum osmolarity and serum sodium as indicated or ordered  - Monitor  response to interventions for patient's volume status, including labs, urine output, blood pressure (other measures as available)  - Encourage oral intake as appropriate  - Instruct patient on fluid and nutrition restrictions as appropriate  Outcome: Adequate for Discharge     Problem: PAIN - ADULT  Goal: Verbalizes/displays adequate comfort level or patient's stated pain goal  Description: INTERVENTIONS:  - Encourage pt to monitor pain and request assistance  - Assess pain using appropriate pain scale  - Administer analgesics based on type and severity of pain and evaluate response  - Implement non-pharmacological measures as appropriate and evaluate response  - Consider cultural and social influences on pain and pain management  - Manage/alleviate anxiety  - Utilize distraction and/or relaxation techniques  - Monitor for opioid side effects  - Notify MD/LIP if interventions unsuccessful or patient reports new pain  - Anticipate increased pain with activity and pre-medicate as appropriate  Outcome: Adequate for Discharge     Problem: RISK FOR INFECTION - ADULT  Goal: Absence of fever/infection during anticipated neutropenic period  Description: INTERVENTIONS  - Monitor WBC  - Administer growth factors as ordered  - Implement neutropenic guidelines  Outcome: Adequate for Discharge     Problem: SAFETY ADULT - FALL  Goal: Free from fall injury  Description: INTERVENTIONS:  - Assess pt frequently for physical needs  - Identify cognitive and physical deficits and behaviors that affect risk of falls.  - [place] fall precautions as indicated by assessment.  - Educate pt/family on patient safety including physical limitations  - Instruct pt to call for assistance with activity based on assessment  - Modify environment to reduce risk of injury  - Provide assistive devices as appropriate  - Consider OT/PT consult to assist with strengthening/mobility  - Encourage toileting schedule  Outcome: Adequate for Discharge      Problem: DISCHARGE PLANNING  Goal: Discharge to home or other facility with appropriate resources  Description: INTERVENTIONS:  - Identify barriers to discharge w/pt and caregiver  - Include patient/family/discharge partner in discharge planning  - Arrange for needed discharge resources and transportation as appropriate  - Identify discharge learning needs (meds, wound care, etc)  - Arrange for interpreters to assist at discharge as needed  - Consider post-discharge preferences of patient/family/discharge partner  - Complete POLST form as appropriate  - Assess patient's ability to be responsible for managing their own health  - Refer to Case Management Department for coordinating discharge planning if the patient needs post-hospital services based on physician/LIP order or complex needs related to functional status, cognitive ability or social support system  Outcome: Adequate for Discharge

## 2024-12-02 ENCOUNTER — PATIENT OUTREACH (OUTPATIENT)
Dept: CASE MANAGEMENT | Age: 79
End: 2024-12-02

## 2024-12-02 ENCOUNTER — OFFICE VISIT (OUTPATIENT)
Dept: INTERNAL MEDICINE CLINIC | Facility: CLINIC | Age: 79
End: 2024-12-02
Payer: MEDICARE

## 2024-12-02 VITALS
HEIGHT: 66 IN | DIASTOLIC BLOOD PRESSURE: 84 MMHG | BODY MASS INDEX: 20.41 KG/M2 | WEIGHT: 127 LBS | RESPIRATION RATE: 16 BRPM | SYSTOLIC BLOOD PRESSURE: 140 MMHG | HEART RATE: 90 BPM

## 2024-12-02 DIAGNOSIS — Z09 HOSPITAL DISCHARGE FOLLOW-UP: Primary | ICD-10-CM

## 2024-12-02 DIAGNOSIS — I70.209 ATHEROSCLEROSIS OF ARTERIES OF EXTREMITIES (HCC): ICD-10-CM

## 2024-12-02 DIAGNOSIS — I10 ESSENTIAL HYPERTENSION: ICD-10-CM

## 2024-12-02 DIAGNOSIS — K57.20 PERFORATION OF SIGMOID COLON DUE TO DIVERTICULITIS: ICD-10-CM

## 2024-12-02 DIAGNOSIS — K57.92 ACUTE DIVERTICULITIS: ICD-10-CM

## 2024-12-02 NOTE — PROGRESS NOTES
1300- Assumed care of this pt at this time. Report received report from Jyothi Zamora RN Report included the following information SBAR, Kardex, ED Summary, Procedure Summary, Intake/Output, MAR, and Recent Results    1400- Pt is alert and oriented x 4 and following commands. Wife at bedside. Remains on heated HiFlow 45%. See MAR and Flowsheets for more details. 1600- Reassessment complete, pt resting comfortably in bed. Wife remains at bedside. See Flowsheets for more details. 1900- Shift report given to JEREMY Deleon RN Report included the following information SBAR, Kardex, ED Summary, Procedure Summary, Intake/Output, MAR, and Recent Results Subjective:   Emily Rice is a 79 year old female who presents for hospital follow up.   She was discharged from [facility] to Home or Self Care  Admission Date: 11/27/24   Discharge Date: 11/29/24  Hospital Discharge Diagnosis:   Acute diverticulitis     Interactive contact within 2 business days post discharge first initiated on Date: 12/2/2024    I accessed [employer] and/or [employer] Backoffice Everywhere and personally reviewed the following for the recent hospitalization: provider notes, consults, summaries, labs and other test results and the pertinent findings are documented below.     HPI:   Patient presents for hospital follow up.   She denies any abdominal pain.   Tolerating a soft diet.   Normal bowel movements. No nausea or vomiting.   First episode of acute diverticulitis.    She is currently taking Augmentin.     Patient is a 79-year-old  female who came into the emergency department for evaluation of left lower quadrant abdominal pain for the last 2 to 3 days. CBC showed white blood cell count of 14.7 with left shift. Chemistry and liver function tests were unremarkable. Urinalysis showed possible urinary tract infection. CT scan of the abdomen and pelvis showed acute diverticulitis involving the sigmoid colon with peripherally enhancing collection extending from the undersurface of the sigmoid colon to the left vaginal cuff, measuring 2.3 cm in maximal dimension, which may reflect colovaginal fistula or abscess. Left posterolateral bladder wall thickening measuring 10 mm in thickness, could be reactive secondary to adjacent acute diverticulitis or less likely reflect an underlying bladder mass. No other acute findings. Patient was started on IV antibiotics, and she will be admitted to the hospital for further management.     Acute Diverticulitis   Acute sigmoid colon diverticulitis with inferior pericolonic abscess versus colovaginal fistula  - Patient denies any vaginal discharge  - Seen by surgery, no  surgical intervention at this time  - Seen by GI  - Tolerating clear liquid diet, will advance to full liquid diet  - Continue IV antibiotics     Peripheral arterial disease  Bilateral SFA occlusion, small infrarenal saccular aortic aneurysm  - seen by vascular surgery, follow-up as outpatient       Hypertension  - Stable      History/Other:   Current Medications:  Medication Reconciliation:  I am aware of an inpatient discharge within the last 30 days.  The discharge medication list has been reconciled with the patient's current medication list and reviewed by me. See medication list for additions of new medication, and changes to current doses of medications and discontinued medications.  Outpatient Medications Marked as Taking for the 24 encounter (Office Visit) with Shannan Fitzgerald APRN   Medication Sig    [] amoxicillin clavulanate 875-125 MG Oral Tab Take 1 tablet by mouth 2 (two) times daily for 14 days.    aspirin 81 MG Oral Tab EC Take 1 tablet (81 mg total) by mouth daily.    CLOPIDOGREL 75 MG Oral Tab TAKE 1 TABLET(75 MG) BY MOUTH DAILY    losartan 100 MG Oral Tab Take 1 tablet (100 mg total) by mouth daily.    amLODIPine 10 MG Oral Tab Take 1 tablet (10 mg total) by mouth daily.    cholecalciferol 50 MCG (2000 UT) Oral Cap Take 1 capsule (2,000 Units total) by mouth daily.       Review of Systems:  GENERAL: weight stable, energy stable, no sweating  SKIN: denies any unusual skin lesions  EYES: denies blurred vision or double vision  HEENT: denies nasal congestion, sinus pain or ST  LUNGS: denies shortness of breath with exertion  CARDIOVASCULAR: denies chest pain on exertion or palpitations  GI: denies abdominal pain, denies heartburn, denies diarrhea  MUSCULOSKELETAL: denies pain, normal range of motion of extremities  NEURO: denies headaches, denies dizziness, denies weakness  PSYCHE: denies depression or anxiety  HEMATOLOGIC: denies hx of anemia, or bruising, denies  bleeding  ENDOCRINE: denies thyroid history  ALL/ASTHMA: denies hx of allergy or asthma    Objective:   No LMP recorded (lmp unknown). Patient has had a hysterectomy.  Estimated body mass index is 20.5 kg/m² as calculated from the following:    Height as of this encounter: 5' 6\" (1.676 m).    Weight as of this encounter: 127 lb (57.6 kg).   /84 (BP Location: Right arm, Patient Position: Sitting, Cuff Size: adult)   Pulse 90   Resp 16   Ht 5' 6\" (1.676 m)   Wt 127 lb (57.6 kg)   LMP  (LMP Unknown)   BMI 20.50 kg/m²    GENERAL: well developed, well nourished, in no apparent distress  SKIN: no rashes, no suspicious lesions  HEENT: atraumatic, normocephalic, ears and throat are clear  EYES: PERRLA, EOMI, conjunctiva are clear  NECK: supple, no adenopathy, no bruits  CHEST: no chest tenderness  LUNGS: clear to auscultation  CARDIO: RRR without murmur  GI: good BS's, no masses, HSM or tenderness  MUSCULOSKELETAL: back is not tender, FROM of the extremities  EXTREMITIES: no cyanosis, clubbing or edema  NEURO: Oriented times three, cranial nerves are intact, motor and sensory are grossly intact    Assessment & Plan:   1. Hospital discharge follow-up (Primary)  - hospital notes, imaging and labs reviewed   2. Atherosclerosis of arteries of extremities (HCC)  - seen by vascular in the hospital   No surgical intervention indicated at this time.  Please continue patient's Plavix and aspirin.  We are going to continue follow her up as an outpatient.  3. Acute diverticulitis  - continue oral antibiotics  - follow up with GI   - plan for colonoscopy in 8 weeks   4. Essential hypertension  - CPM   - BP stable   5. Perforation of sigmoid colon due to diverticulitis   - follow up with general surgery in 2 weeks Dr Hernandez  - continue antibiotics as prescribed        Return for if symptoms do not resolve.

## 2024-12-02 NOTE — PROGRESS NOTES
NC planned to contact patient for TCM however, TCM HFU appointment was completed today 12/02/2024 with Shannan NAVARRETE.  [facility] closing encounter.     12/2/2024 4:00 PM   Office Visit  MRN: [id number]  Description: 79 year old female Provider: Shannan Fitzgerald APRN Department: [facility]-INTERNAL MED     Office Visit  Open  12/2/2024  [facility]       Shannan Fitzgerald APRN  Nurse Practitioner TCM (Transition Of Care Management)  Reason for Visit

## 2024-12-02 NOTE — PROGRESS NOTES
Attempted to reach the patient to complete a Transitional Care Management-Hospital follow up call. Left a message for the patient to call the nurse care manager back at, [phone].    The patient is scheduled with SEFERINO Miller, on 12/2/2024, at 4:00. Nurse care manager will continue to follow.

## 2024-12-02 NOTE — TELEPHONE ENCOUNTER
Spoke to patient    Scheduled her for a hospital follow up  Location, date and time confirmed    Your Appointments      Monday December 09, 2024 11:00 AM  Consult with Luz Green PA-C  [facility], [facility] ([facility]) [facility] [address] [facility] [address]  [facility] [id number]  [phone]

## 2024-12-09 ENCOUNTER — TELEPHONE (OUTPATIENT)
Facility: CLINIC | Age: 79
End: 2024-12-09

## 2024-12-09 ENCOUNTER — OFFICE VISIT (OUTPATIENT)
Facility: CLINIC | Age: 79
End: 2024-12-09
Payer: MEDICARE

## 2024-12-09 VITALS
HEART RATE: 96 BPM | SYSTOLIC BLOOD PRESSURE: 130 MMHG | DIASTOLIC BLOOD PRESSURE: 78 MMHG | WEIGHT: 122 LBS | HEIGHT: 66 IN | BODY MASS INDEX: 19.61 KG/M2

## 2024-12-09 DIAGNOSIS — I70.209 ATHEROSCLEROSIS OF ARTERIES OF EXTREMITIES (HCC): ICD-10-CM

## 2024-12-09 DIAGNOSIS — K57.20 PERFORATION OF SIGMOID COLON DUE TO DIVERTICULITIS: Primary | ICD-10-CM

## 2024-12-09 DIAGNOSIS — K57.92 ACUTE DIVERTICULITIS: Primary | ICD-10-CM

## 2024-12-09 PROCEDURE — 99214 OFFICE O/P EST MOD 30 MIN: CPT | Performed by: PHYSICIAN ASSISTANT

## 2024-12-09 NOTE — TELEPHONE ENCOUNTER
Dr. Najjar,   Pt is scheduled for a colonoscopy on 3/3/25. The GI service is requesting your approval to hold plavix for 5 days prior to procedure. Are you agreeable?   Thank you,  Lili

## 2024-12-09 NOTE — PATIENT INSTRUCTIONS
Recommendations:  -continue augmentin   -follow up with General Surgery   -contact office or go to ER with worsening abdominal pain       True Hernandez    [address] [place] [address]  [place]ogdan. [address] [place] [address]    [phone]      1. Schedule colonoscopy with [facility] after 1/27/2025.   Diagnosis: acute diverticulitis   Sedation: MAC  Prep: split dose golytely    2. MEDICATION CHANGES PRIOR TO PROCEDURE    7 days BEFORE procedure: *HOLD Iron pills, herbal supplements, multivitamins, or weight loss/diet medications (i.e.Phentermine/Vyvanse) or prasugrel (effient- antiplatelet)  5 days BEFORE procedure *HOLD warfarin (Coumadin) ticagrelor (brilinta) clopidogrel (Plavix) or aggrenox  Will reach out to Dr. Malin for clearance on Plavix.  DO NOT TAKE: Any form of alcohol, recreational drugs and any forms of erectile dysfunction medications 24 hours prior to procedure.      3.  bowel prep from pharmacy   You can pick the bowel prep up now and store in a cool, dry place in your home until your scheduled bowel prep start date.    4. Read all bowel prep instructions carefully. Bowel prep instructions can also be found online at:  www.[facility].org/giprep     5. AVOID seeds, nuts, popcorn, raw fruits and vegetables for 5 days before procedure    6. If you start any NEW medication after your visit today, please notify us. Certain medications (like iron or weight loss medications) will need to be held before the procedure, or the procedure cannot be performed safely.

## 2024-12-09 NOTE — PROGRESS NOTES
[facility] - Gastroenterology                                                                                                               Reason for consult:   Chief Complaint   Patient presents with    GI Bleeding    Hospital F/U       Requesting physician or provider: Kimberly Simons MD      HPI:   Emily Rice is a 79 year old year-old female with history of  breast cancer s/p mastectomy, lumpectomy and chemo/radiation therapy, HTN who presents for hospital follow up.   Admitted for acute sigmoid diverticultits with complication/abscess. In the ED, CT w/ acute diverticulitis of the sigmoid colon with 2.3 cm fluid collection extending from the undersurface of the sigmoid colon to the L vaginal cuff c/f colovaginal fistula vs abscess. She denies history of diverticulitis. No previous colonoscopy. No family history CRC. Patient had improvement of symptoms while admistted and advised to follow up with surgery and GI.     Follow up:  -no abdominal pain   -BM every other day   -brown in color  -still on soft diet  -taking augmentin   -no nausea or vomiting  -appetite improving   -no bright red blood in stool, no melena     -weight has been stable    -on plavix for CAD       NSAIDS:  none  Tobacco: once and awhile, 1 cigarette  Alcohol:rare  Marijuana: none  Illicit drugs: none    FH GI malignancy- none  FH celiac dz- none  FH liver dz- none  FH IBD- none      Last colonoscopy: none  Last EGD: none    Wt Readings from Last 6 Encounters:   12/09/24 122 lb (55.3 kg)   12/02/24 127 lb (57.6 kg)   11/27/24 128 lb (58.1 kg)   08/07/24 137 lb 4 oz (62.3 kg)   05/07/24 138 lb (62.6 kg)   03/14/24 134 lb (60.8 kg)        History, Medications, Allergies, ROS:      Past Medical History:    Abnormal vaginal bleeding    At high risk for breast cancer    Breast cancer, left breast (HCC)    lumpectomy and RT    Breast cancer, right (HCC)    mastectomy and CMF    Essential  hypertension    Hemorrhoids    History of breast cancer    History of lumpectomy    HTN (hypertension)    Musculoskeletal disorder    sciatic nerve problems    Osteoporosis screening    Status post mastectomy    Thrombocytosis    Uncontrolled hypertension      Past Surgical History:   Procedure Laterality Date    Chemotherapy  1990    right breast ca    Hysterectomy  1979    States one ovary left in place.    Lumpectomy left  2010    Mastectomy right  1990    Radiation left  2010      Family Hx:   Family History   Problem Relation Age of Onset    Other (Other) Father         Cirrhosis    Heart Disease Mother     Other (atrial fib) Sister     Other (bowel resection) Sister     Other (alive and well) Daughter         x3    Breast Cancer Self         rt 1990 & lt 2010      Social History:   Social History     Socioeconomic History    Marital status:     Number of children: 3   Occupational History    Occupation: retired      Comment: office work    Tobacco Use    Smoking status: Every Day     Current packs/day: 0.04     Average packs/day: (2.1 ttl pk-yrs)     Types: Cigarettes     Passive exposure: Current    Smokeless tobacco: Never    Tobacco comments:     About 4 cigarrettes a day   Vaping Use    Vaping status: Never Used   Substance and Sexual Activity    Alcohol use: Yes     Alcohol/week: 4.0 standard drinks of alcohol     Types: 4 Cans of beer per week     Comment: occasionally    Drug use: No   Other Topics Concern     Service No    Caffeine Concern Yes     Comment: Coffee 3 cups daily, Soda    Occupational Exposure No     Social Drivers of Health     Food Insecurity: No Food Insecurity (11/27/2024)    Food Insecurity     Food Insecurity: Never true   Transportation Needs: No Transportation Needs (11/27/2024)    Transportation Needs     Lack of Transportation: No   Housing Stability: Low Risk  (11/27/2024)    Housing Stability     Housing Instability: No        Medications (Active prior to  today's visit):  Current Outpatient Medications   Medication Sig Dispense Refill    polyethylene glycol, PEG 3350-KCl-NaBcb-NaCl-NaSulf, 236 g Oral Recon Soln Take 4,000 mL by mouth As Directed. Take 2,000 mL the night before your procedure and 2,000 mL the morning of your procedure. 1 each 0    amoxicillin clavulanate 875-125 MG Oral Tab Take 1 tablet by mouth 2 (two) times daily for 14 days. 28 tablet 0    aspirin 81 MG Oral Tab EC Take 1 tablet (81 mg total) by mouth daily. 30 tablet 0    CLOPIDOGREL 75 MG Oral Tab TAKE 1 TABLET(75 MG) BY MOUTH DAILY 90 tablet 1    losartan 100 MG Oral Tab Take 1 tablet (100 mg total) by mouth daily. 90 tablet 1    amLODIPine 10 MG Oral Tab Take 1 tablet (10 mg total) by mouth daily. 90 tablet 3    cholecalciferol 50 MCG (2000 UT) Oral Cap Take 1 capsule (2,000 Units total) by mouth daily.      atorvastatin 40 MG Oral Tab Take 1 tablet (40 mg total) by mouth nightly. (Patient not taking: Reported on 12/9/2024) 90 tablet 3       Allergies:  Allergies[1]    ROS:   CONSTITUTIONAL: negative for fevers, chills, sweats and weight loss  EYES Negative for red eyes, yellow eyes, changes in vision  HEENT: Negative for dysphagia and hoarseness  RESPIRATORY: Negative for cough and shortness of breath  CARDIOVASCULAR: Negative for chest pain, palpitations  GASTROINTESTINAL: See HPI  GENITOURINARY: Negative for dysuria and frequency  MUSCULOSKELETAL: Negative for arthralgias and myalgias  NEUROLOGICAL: Negative for dizziness and headaches  BEHAVIOR/PSYCH: Negative for anxiety and poor appetite    PHYSICAL EXAM:   Blood pressure 130/78, pulse 96, height 5' 6\" (1.676 m), weight 122 lb (55.3 kg).    GEN: WD/WN, NAD  HEENT: Supple symmetrical, trachea midline  CV: RRR, the extremities are warm and well perfused   LUNGS: No increased work of breathing  ABDOMEN: No scars, normal bowel sounds, soft, non-tender, non-distended no rebound or guarding, no masses, no hepatomegaly  MSK: No redness, no  warmth, no swelling of joints  SKIN: No jaundice, no erythema, no rashes  HEMATOLOGIC: No bleeding, no bruising  NEURO: Alert and interactive, normal gait    Labs/Imaging/Procedures:     Patient's pertinent labs and imaging were reviewed and discussed with patient today.     Lab Results   Component Value Date    WBC 9.1 11/29/2024    RBC 4.65 11/29/2024    HGB 15.6 11/29/2024    HCT 42.9 11/29/2024    MCV 92.3 11/29/2024    MCH 33.5 11/29/2024    MCHC 36.4 11/29/2024    RDW 12.7 11/29/2024    .0 11/29/2024    MPV 8.9 06/11/2018        Lab Results   Component Value Date     (H) 11/29/2024    BUN <5 (L) 11/29/2024    BUNCREA 10.2 11/28/2024    CREATSERUM 0.54 (L) 11/29/2024    ANIONGAP 7 11/29/2024    GFRNAA 85 05/18/2022    GFRAA 98 05/18/2022    CA 9.2 11/29/2024    OSMOCALC 284 11/28/2024    ALKPHO 108 11/27/2024    AST 21 11/27/2024    ALT 13 11/27/2024    ALKPHOS 93 05/12/2016    BILT 0.6 11/27/2024    TP 7.1 11/27/2024    ALB 4.6 11/27/2024    GLOBULIN 2.6 08/07/2024    AGRATIO 0.8 (L) 05/12/2016     11/29/2024    K 3.8 11/29/2024     11/29/2024    CO2 23.0 11/29/2024        CT ABDOMEN+PELVIS(CONTRAST ONLY)(CPT=74177)    Result Date: 11/27/2024  PROCEDURE: CT ABDOMEN + PELVIS (CONTRAST ONLY) (CPT=74177)  COMPARISON: [facility], CTA ABDOMEN/PELVIS LOWER EXT BILAT W RUNOFF (LZI=36890), 3/07/2024, 3:31 PM.  INDICATIONS: urinary sx  TECHNIQUE: CT images of the abdomen and pelvis were obtained with non-ionic intravenous contrast material.  Automated exposure control for dose reduction was used. Adjustment of the mA and/or kV was done based on the patient's size. Use of iterative reconstruction technique for dose reduction was used.  Dose information is transmitted to the ACR (American College of Radiology) NRDR (National Radiology Data Registry) which includes the Dose Index Registry.  FINDINGS:  LOWER CHEST: There is mild atelectasis/scarring involving the left greater than  right lower lobes.  The previously described left lower lobe atelectasis has resolved.  There is small airways disease with opacification of a few left subsegmental bronchi.  Triple-vessel coronary artery calcifications.  There is prominent pericardial fat.  There are postoperative changes from prior right mastectomy.  HEPATOBILIARY:   The liver is normal in size.  The liver is unchanged in density.  There is fatty infiltration adjacent to the falciform ligament (3:41).  No acute cholecystitis.  There is a 6 mm region of nodular gallbladder wall thickening at the fundus (3:49).  No biliary ductal dilatation.  SPLEEN:   No enlargement or focal lesion.   PANCREAS:   No lesion, fluid collection, ductal dilatation, or atrophy.   ADRENALS:   No mass or enlargement.   GENITOURINARY:   No renal calculus.  There are bilateral renal vascular calcifications.  No hydronephrosis.  No enhancing renal mass.  There are left upper pole renal cyst measuring 1.2 cm (3:  32).  There is left posterolateral bladder wall thickening measuring up to 10 mm in thickness (3:113).  No air in the urinary bladder.  GI TRACT:    There is acute diverticulitis involving the sigmoid colon in the pelvis.  There is mild circumferential bowel wall thickening in this region with adjacent fat stranding and fascial plane predominance.  There is a peripherally enhancing 0.5 x  1.6 x 2.3 cm collection that is contiguous with the undersurface of the sigmoid colon and the left vaginal cuff (3:118).  No bowel obstruction.  No acute appendicitis.  No abnormal small bowel wall thickening.  Small hiatal hernia.  PELVIC ORGANS: The uterus is surgically absent.  The peripherally enhancing lesion involving the vaginal cuff is described above.  AORTA/VASCULAR:   There are occlusions of the bilateral superficial femoral arteries (3:140).  There is atherosclerotic calcification of the abdominal aorta and its branching vessels.  There is a 10 x 9 mm rightward directed  saccular aneurysm arising from the infrarenal abdominal aorta (6:47).  RETROPERITONEUM:   No mass or enlarged adenopathy.   PERITONEUM: No pneumoperitoneum or ascites.  LYMPH NODES: No evidence of lymphadenopathy.   BONES:  The chronic left superior endplate compression fracture deformity at L3 is unchanged.  Multilevel degenerative changes of the lower thoracic and lumbar spine.  Multifocal degenerative change involving the osseous pelvis, which is most advanced at  the symphysis pubis.  No acute fracture.  The bones are demineralized.  OTHER:   Negative.           CONCLUSION:   1. Acute diverticulitis involving the sigmoid colon.  There is a peripherally enhancing collection extending from the undersurface of the sigmoid colon to the left vaginal cuff measuring 2.3 cm in maximal dimension, which may reflect a colovaginal fistula/abscess. 2. Left posterolateral bladder wall thickening measuring 10 mm in thickness, which may be reactive secondary to the adjacent acute diverticulitis, reflect cystitis, or less likely reflect an underlying bladder mass.  No air in the urinary bladder to suggest a colovesicular fistula. 3. No hydronephrosis or urinary calculus. 4. No bowel obstruction, acute appendicitis, or pneumoperitoneum.  5. Redemonstrated occlusions of the bilateral superficial femoral arteries. 6. A 10 x 9 mm rightward directed saccular aneurysm arising from the infrarenal abdominal aorta, which is not significantly changed when compared 03/07/2024. 7. A 6 mm region of fundal gallbladder wall thickening, which may reflect adenomyomatosis.  Consider nonemergent follow-up right upper quadrant ultrasound for further evaluation. 8. Small airways disease with opacification of a few left subsegmental bronchi.  The previously described left lower lobe collapsed has resolved. 9. Lesser incidental findings described above.     Dictated by (CST): Tuan Minor MD on 11/27/2024 at 1:16 PM     Finalized by (CST):  Tuan Minor MD on 11/27/2024 at 1:30 PM                  .  ASSESSMENT/PLAN:   Emily Rice is a 79 year old year-old female with history of  breast cancer s/p mastectomy, lumpectomy and chemo/radiation therapy, HTN who presents for hospital follow up.   Admitted for acute sigmoid diverticultits with complication/abscess. In the ED, CT w/ acute diverticulitis of the sigmoid colon with 2.3 cm fluid collection extending from the undersurface of the sigmoid colon to the L vaginal cuff c/f colovaginal fistula vs abscess. She denies history of diverticulitis. No previous colonoscopy. No family history CRC. Patient had improvement of symptoms while admistted and advised to follow up with surgery and GI.     #acute diverticulitis   -patient feeling well today, no longer having abdominal pain  -having brown BM every other day   -continues ABX  -on soft diet   -has not followed with surgery team at this time  -no prior colonoscopy, no unintentional weight loss or decreased appetite  -discussed plan to complete abx, follow up with surgery team and colonoscopy in next 8 weeks    #CAD   -follows with Dr. Najjar   -will obtain clearance if ok to hold plavix prior to procedure     Recommendations:  -continue augmentin   -follow up with General Surgery   -contact office or go to ER with worsening abdominal pain       True Hernandez    [address] [place] [address]  [place]ogdan. [address] [place] [id number] [phone]      1. Schedule colonoscopy with [facility] after 1/27/2025.   Diagnosis: acute diverticulitis   Sedation: MAC  Prep: split dose golytely    2. MEDICATION CHANGES PRIOR TO PROCEDURE    7 days BEFORE procedure: *HOLD Iron pills, herbal supplements, multivitamins, or weight loss/diet medications (i.e.Phentermine/Vyvanse) or prasugrel (effient- antiplatelet)  5 days BEFORE procedure *HOLD warfarin (Coumadin) ticagrelor (brilinta) clopidogrel (Plavix) or aggrenox  Will reach out to Dr. Malin for clearance on  Plavix.  DO NOT TAKE: Any form of alcohol, recreational drugs and any forms of erectile dysfunction medications 24 hours prior to procedure.      3.  bowel prep from pharmacy   You can pick the bowel prep up now and store in a cool, dry place in your home until your scheduled bowel prep start date.    4. Read all bowel prep instructions carefully. Bowel prep instructions can also be found online at:  [email or web address]     5. AVOID seeds, nuts, popcorn, raw fruits and vegetables for 5 days before procedure    6. If you start any NEW medication after your visit today, please notify us. Certain medications (like iron or weight loss medications) will need to be held before the procedure, or the procedure cannot be performed safely.      Orders This Visit:  No orders of the defined types were placed in this encounter.      Meds This Visit:  Requested Prescriptions     Signed Prescriptions Disp Refills    polyethylene glycol, PEG 3350-KCl-NaBcb-NaCl-NaSulf, 236 g Oral Recon Soln 1 each 0     Sig: Take 4,000 mL by mouth As Directed. Take 2,000 mL the night before your procedure and 2,000 mL the morning of your procedure.       Imaging & Referrals:  None    ENDOSCOPIC RISK BENEFIT DISCUSSION: I described the procedure in great detail with the patient. I discussed the risks and benefits, including but not limited to: bleeding, perforation, infection, anesthesia complications, and even death. Patient will be NPO after midnight and will have a person physically present at time of pick-up to drive patient home. Patient verbalized understanding and agrees to proceed with procedure as planned.      Luz Green PA-C   12/9/2024        This note was partially prepared using Dragon Medical voice recognition dictation software. As a result, errors may occur. When identified, these errors have been corrected. While every attempt is made to correct errors during dictation, discrepancies may still exist.           [1] No Known Allergies

## 2024-12-09 NOTE — TELEPHONE ENCOUNTER
Nursing: Please reach out to Dr. Najjar for recommendations on holding plavix prior to procedure for 5 days.     Luz Green PA-C

## 2024-12-09 NOTE — TELEPHONE ENCOUNTER
Scheduled for:  COLONOSCOPY 490[address]  Provider Name:  DR KRISHNAN   Date:  3/3/2025  Location:   [facility]   Sedation:  MAC   Time:  1125AM(pt is aware that ENDO will call the day before to confirm arrival time)  Prep:  GOLYTELY   Meds/Allergies Reconciled?:  GABE CHADWICK   Diagnosis with codes:  ACUTE DIVERTICULITIS  K 57.92  Was patient informed to call insurance with codes (Y/N):  Yes, I confirmed the insurance with the patient.   Referral sent?:  N/A  EM notified?:  I sent an electronic request to Endo Scheduling and received a confirmation today.      Medication Orders:  This patient verbally confirmed that she is not taking:   Iron,SHE IS TAKING  blood thinners, BP meds, and is not diabetic   Not latex allergy, Not PCN allergy and does not have a pacemaker     Misc Orders:      HOLD PLAVIX PER DR CAREY INSTRUCTIONS      Further instructions given by staff:

## 2024-12-10 NOTE — TELEPHONE ENCOUNTER
RN called and spoke to pt, discussed that Dr. Najjar gave approval to hold plavix x 5 days before procedure. Last dose of plavix to be on 2/25/25. [employer] message also sent with this info. Pt states she was given written instructions on how to prep for procedure at recent clinic appt. Pt verbalized understanding and had no further needs at this time.

## 2024-12-10 NOTE — TELEPHONE ENCOUNTER
RN called pt, no answer so left message. GI office number provided.      Najjar, Samer F, MD  You14 hours ago (10:00 PM)       yes

## 2025-02-24 RX ORDER — LOSARTAN POTASSIUM 100 MG/1
100 TABLET ORAL DAILY
Qty: 90 TABLET | Refills: 3 | Status: SHIPPED | OUTPATIENT
Start: 2025-02-24

## 2025-02-24 NOTE — TELEPHONE ENCOUNTER
Refill passed per [facility] protocol.     Requested Prescriptions   Pending Prescriptions Disp Refills    LOSARTAN 100 MG Oral Tab [Pharmacy Med Name: LOSARTAN 100MG TABLETS] 90 tablet 1     Sig: TAKE 1 TABLET(100 MG) BY MOUTH DAILY       Hypertension Medications Protocol Passed - 2/24/2025  7:04 AM        Passed - CMP or BMP in past 12 months        Passed - Last BP reading less than 140/90     BP Readings from Last 1 Encounters:   12/09/24 130/78               Passed - In person appointment or virtual visit in the past 12 mos or appointment in next 3 mos     Recent Outpatient Visits              2 months ago Perforation of sigmoid colon due to diverticulitis    [facility] [place] Luz Green PA-C    Office Visit    2 months ago [facility] [place]    Office Visit    6 months ago Medicare annual wellness visit, subsequent    [facility], Kimberly Swan MD    Office Visit    9 months ago Atherosclerosis of both carotid arteries    [facility], Ronen Vaz MD    Office Visit    11 months ago [facility]otid artery disease without cerebral infarction    [facility]    Office Visit          Future Appointments         Provider Department Appt Notes    In 1 week EULOGIO, PROCEDURE [facility] COLONOSCOPY WITH MAC                    Passed - EGFRCR or GFRNAA > 50     GFR Evaluation  EGFRCR: 94 , resulted on 11/29/2024          Passed - Medication is active on med list             [unfilled]      @[email or web address] [email or web address]@

## 2025-03-03 ENCOUNTER — ANESTHESIA EVENT (OUTPATIENT)
Dept: ENDOSCOPY | Facility: HOSPITAL | Age: 80
End: 2025-03-03
Payer: MEDICARE

## 2025-03-03 ENCOUNTER — ANESTHESIA (OUTPATIENT)
Dept: ENDOSCOPY | Facility: HOSPITAL | Age: 80
End: 2025-03-03
Payer: MEDICARE

## 2025-03-03 ENCOUNTER — HOSPITAL ENCOUNTER (OUTPATIENT)
Facility: HOSPITAL | Age: 80
Setting detail: HOSPITAL OUTPATIENT SURGERY
Discharge: HOME | End: 2025-03-03
Attending: INTERNAL MEDICINE | Admitting: INTERNAL MEDICINE
Payer: MEDICARE

## 2025-03-03 DIAGNOSIS — K57.92 ACUTE DIVERTICULITIS: ICD-10-CM

## 2025-03-03 PROBLEM — K63.5 POLYP OF COLON: Status: ACTIVE | Noted: 2025-03-03

## 2025-03-03 PROBLEM — K57.90 DIVERTICULOSIS: Status: ACTIVE | Noted: 2025-03-03

## 2025-03-03 PROBLEM — K64.9 HEMORRHOIDS: Status: ACTIVE | Noted: 2025-03-03

## 2025-03-03 PROCEDURE — 0DBL8ZX EXCISION OF TRANSVERSE COLON, VIA NATURAL OR ARTIFICIAL OPENING ENDOSCOPIC, DIAGNOSTIC: ICD-10-PCS | Performed by: INTERNAL MEDICINE

## 2025-03-03 PROCEDURE — 45380 COLONOSCOPY AND BIOPSY: CPT | Performed by: INTERNAL MEDICINE

## 2025-03-03 RX ORDER — SODIUM CHLORIDE, SODIUM LACTATE, POTASSIUM CHLORIDE, CALCIUM CHLORIDE 600; 310; 30; 20 MG/100ML; MG/100ML; MG/100ML; MG/100ML
INJECTION, SOLUTION INTRAVENOUS CONTINUOUS
Status: DISCONTINUED | OUTPATIENT
Start: 2025-03-03 | End: 2025-03-03

## 2025-03-03 RX ORDER — LIDOCAINE HYDROCHLORIDE 10 MG/ML
INJECTION, SOLUTION EPIDURAL; INFILTRATION; INTRACAUDAL; PERINEURAL AS NEEDED
Status: DISCONTINUED | OUTPATIENT
Start: 2025-03-03 | End: 2025-03-03 | Stop reason: SURG

## 2025-03-03 RX ORDER — NALOXONE HYDROCHLORIDE 0.4 MG/ML
0.08 INJECTION, SOLUTION INTRAMUSCULAR; INTRAVENOUS; SUBCUTANEOUS ONCE AS NEEDED
Status: DISCONTINUED | OUTPATIENT
Start: 2025-03-03 | End: 2025-03-03

## 2025-03-03 RX ADMIN — LIDOCAINE HYDROCHLORIDE 50 MG: 10 INJECTION, SOLUTION EPIDURAL; INFILTRATION; INTRACAUDAL; PERINEURAL at 10:52:00

## 2025-03-03 RX ADMIN — SODIUM CHLORIDE, SODIUM LACTATE, POTASSIUM CHLORIDE, CALCIUM CHLORIDE: 600; 310; 30; 20 INJECTION, SOLUTION INTRAVENOUS at 10:49:00

## 2025-03-03 NOTE — DISCHARGE INSTRUCTIONS
Home Care Instructions for Colonoscopy with Sedation    Diet:  - Resume your regular diet as tolerated unless otherwise instructed.  - Start with light meals to minimize bloating.  - Do not drink alcohol today.    Medication:  - If you have questions about resuming your normal medications, please contact your Primary Care Physician.    Activities:  - Take it easy today. Do not return to work today.  - Do not drive today.  - Do not operate any machinery today (including kitchen equipment).    Colonoscopy:  - You may notice some rectal \"spotting\" (a little blood on the toilet tissue) for a day or two after the exam. This is normal.  - If you experience any rectal bleeding (not spotting), persistent tenderness or sharp severe abdominal pains, oral temperature over 100 degrees Fahrenheit, light-headedness or dizziness, or any other problems, contact your doctor.    **If unable to reach your doctor, please go to the [facility] Emergency Room**    - Your referring physician will receive a full report of your examination.  - If you do not hear from your doctor's office within two weeks of your biopsy, please call them for your results.    You may be able to see your laboratory results in [employer] between 4 and 7 business days.  In some cases, your physician may not have viewed the results before they are released to [employer].  If you have questions regarding your results contact the physician who ordered the test/exam by phone or via [employer] by choosing \"Ask a Medical Question.\"

## 2025-03-03 NOTE — H&P
Pre Procedure History & Physical Examination    Patient Name: Emily Rice  MRN: [id number]  CSN: [id number]  YOB: 1945    Diagnosis: + FOBT    Prescriptions Prior to Admission[1]  Current Facility-Administered Medications   Medication Dose Route Frequency    lactated ringers infusion   Intravenous Continuous     Facility-Administered Medications Ordered in Other Encounters   Medication Dose Route Frequency    lidocaine PF (Xylocaine-MPF) 1% injection   Intravenous PRN    propofol (Diprivan) 10 mg/mL infusion premix   Intravenous Continuous PRN    propofol (Diprivan) 10 MG/ML injection   Intravenous PRN    fentaNYL (Sublimaze) 50 mcg/mL injection   Intravenous PRN       Allergies: Allergies[2]    Past Medical History:    Abnormal vaginal bleeding    At high risk for breast cancer    Breast cancer, left breast (HCC)    lumpectomy and RT    Breast cancer, right (HCC)    mastectomy and CMF    Deep vein thrombosis (HCC)    Essential hypertension    Exposure to medical diagnostic radiation    Hemorrhoids    High blood pressure    History of breast cancer    History of lumpectomy    HTN (hypertension)    Musculoskeletal disorder    sciatic nerve problems    Osteoporosis screening    Personal history of antineoplastic chemotherapy    Status post mastectomy    Thrombocytosis    Uncontrolled hypertension    Visual impairment     Past Surgical History:   Procedure Laterality Date    Chemotherapy  1990    right breast ca    Colonoscopy  03/03/2025    Dr. Bajwa, colon polyp    Hysterectomy  1979    States one ovary left in place.    Lumpectomy left  2010    Mastectomy right  1990    Radiation left  2010     Family History   Problem Relation Age of Onset    Other (Other) Father         Cirrhosis    Heart Disease Mother     Other (atrial fib) Sister     Other (bowel resection) Sister     Other (alive and well) Daughter         x3    Breast Cancer Self         rt 1990 & lt 2010     Social History     Tobacco Use     Smoking status: Every Day     Current packs/day: 0.04     Average packs/day: (2.1 ttl pk-yrs)     Types: Cigarettes     Passive exposure: Current    Smokeless tobacco: Never    Tobacco comments:     About 4 cigarrettes a day   Substance Use Topics    Alcohol use: Yes     Alcohol/week: 4.0 standard drinks of alcohol     Types: 4 Cans of beer per week         ROS:   CONSTITUTIONAL:  negative for fevers, rigors  EYES:  negative for diplopia   RESPIRATORY:  negative for severe shortness of breath  CARDIOVASCULAR:  negative for crushing sub-sternal chest pain  GASTROINTESTINAL:  see HPI  GENITOURINARY:  negative for dysuria or gross hematuria  INTEGUMENT/BREAST:  SKIN:  negative for jaundice   ALLERGIC/IMMUNOLOGIC:  negative for hay fever  ENDOCRINE:  negative for cold intolerance and heat intolerance  MUSCULOSKELETAL:  negative for joint effusion/severe erythema  BEHAVIOR/PSYCH:  negative for psychotic behavior      PHYSICAL EXAM:   /73 (BP Location: Left arm)   Pulse 84   Resp 23   Ht 5' 6\" (1.676 m)   Wt 122 lb (55.3 kg)   LMP  (LMP Unknown)   SpO2 99%   BMI 19.69 kg/m²       Gen: Patient appears comfortable and in no acute discomfort  HEENT: the sclera appears anicteric, oropharynx clear, mucus membranes appear moist  CV: regular rate and rhythm, the extremities are warm and well perfused   Lung: Moves air well; No labored breathing  Abdomen: soft, non-tender exam in all quadrants without rigidity or guarding, non-distended, no abnormal bowel sounds noted, no masses are palpated  Skin: No jaundice  Ext: no cyanosis, clubbing or edema is evident.   Neuro: Alert and interactive, and gross movements of extremities normal    I have discussed the risks and benefits and alternatives of the procedure with the patient/family.  They understand and agree to proceed with plan of care.   I have reviewed recent H&P/clinic notes  Safia Bajwa MD  [facility] - Gastroenterology  3/3/2025  11:37 AM         [1]    Medications Prior to Admission   Medication Sig Dispense Refill Last Dose/Taking    losartan 100 MG Oral Tab Take 1 tablet (100 mg total) by mouth daily. 90 tablet 3 3/2/2025    CLOPIDOGREL 75 MG Oral Tab TAKE 1 TABLET(75 MG) BY MOUTH DAILY 90 tablet 1 2025    amLODIPine 10 MG Oral Tab Take 1 tablet (10 mg total) by mouth daily. 90 tablet 3 3/2/2025    cholecalciferol 50 MCG (2000 UT) Oral Cap Take 1 capsule (2,000 Units total) by mouth daily.   2025    polyethylene glycol, PEG 3350-KCl-NaBcb-NaCl-NaSulf, 236 g Oral Recon Soln Take 4,000 mL by mouth As Directed. Take 2,000 mL the night before your procedure and 2,000 mL the morning of your procedure. 1 each 0     [] amoxicillin clavulanate 875-125 MG Oral Tab Take 1 tablet by mouth 2 (two) times daily for 14 days. 28 tablet 0     [] aspirin 81 MG Oral Tab EC Take 1 tablet (81 mg total) by mouth daily. 30 tablet 0     atorvastatin 40 MG Oral Tab Take 1 tablet (40 mg total) by mouth nightly. (Patient not taking: Reported on 2024) 90 tablet 3    [2] No Known Allergies

## 2025-03-03 NOTE — OPERATIVE REPORT
COLONOSCOPY REPORT    Emily Rice     9/3/1945 Age 79 year old   PCP Kimberly Simons MD Endoscopist Safia Bajwa MD     Date of procedure: 25    Procedure: Colonoscopy w/cold biopsy polypectomy    Pre-operative diagnosis: diverticulitis follow up    Post-operative diagnosis: polyp, poor prep in areas, hemorrhoids, L sided diverticulosis    Medications: MAC sedation    Withdrawal time: 14 minutes    Procedure:  Informed consent was obtained from the patient after the risks of the procedure were discussed, including but not limited to bleeding, perforation, aspiration, infection, or possibility of a missed lesion. After discussions of the risks/benefits and alternatives to this procedure, as well as the planned sedation, the patient was placed in the left lateral decubitus position and begun on continuous blood pressure pulse oximetry and EKG monitoring and this was maintained throughout the procedure. Once an adequate level of sedation was obtained a digital rectal exam was completed. Then the lubricated tip of the [id number] diagnostic video colonoscope was inserted and advanced WITH DIFFICULTY requiring pressure and turning patient to the cecum using the CO2 insufflation technique. The cecum was identified by localizing the trifold, the appendix and the ileocecal valve. Withdrawal was begun with thorough washing and careful examination of the colonic walls and folds. A routine second examination of the cecum/ascending colon was performed. Photodocumentation was obtained. The bowel prep was poor in cecum. Views of the colon were fair with poor prep in cecum with washing. I then carefully withdrew the instrument from the patient who tolerated the procedure well.     Complications: none.    Findings:   1. 1 polyp(s) noted as follows:      A. 5 mm polyp in the transverse colon; flat morphology; cold biopsy polypectomy and retrieved.    2. Diverticulosis: left sided.    3. Terminal ileum: the  visualized mucosa appeared normal.    4. A retroflexed view of the rectum revealed hemorrhoids.    5. The colonic mucosa throughout the colon showed normal vascular pattern, without evidence of angioectasias or inflammation.     6. CANDICE: normal rectal tone, no masses palpated.     Recommend:  Await pathology, likely repeat colonoscopy in 1 year depending on wishes. The interval for the next colonoscopy will be determined after reviewing pathology. If new signs or symptoms develop, colonoscopy may need to be repeated sooner.   High fiber diet.  Monitor for blood in the stool. If having more than just tinge of blood, call office or go to the ER.    >>>If tissue was obtained and you have not received your pathology results either by phone or letter within 2 weeks, please call our office at [phone].    Specimens: transverse polyp

## 2025-03-03 NOTE — ANESTHESIA PREPROCEDURE EVALUATION
Anesthesia PreOp Note    HPI:     Emily Rice is a 79 year old female who presents for preoperative consultation requested by: Safia Bajwa MD    Date of Surgery: 3/3/2025    Procedure(s):  COLONOSCOPY  Indication: Acute diverticulitis    Relevant Problems   No relevant active problems       NPO:  Last Liquid Consumption Date: 03/03/25  Last Liquid Consumption Time: 0730  Last Solid Consumption Date: 03/01/25  Last Solid Consumption Time: 1900  Last Liquid Consumption Date: 03/03/25          History Review:  Patient Active Problem List    Diagnosis Date Noted    Acute diverticulitis 11/27/2024    Perforation of sigmoid colon due to diverticulitis 11/27/2024    Atherosclerosis of arteries of extremities 05/07/2024    Atherosclerosis of both carotid arteries 05/07/2024    History of right breast cancer 05/11/2016    Hyperlipidemia 04/06/2005       Past Medical History:    Abnormal vaginal bleeding    At high risk for breast cancer    Breast cancer, left breast (HCC)    lumpectomy and RT    Breast cancer, right (HCC)    mastectomy and CMF    Deep vein thrombosis (HCC)    Essential hypertension    Exposure to medical diagnostic radiation    Hemorrhoids    High blood pressure    History of breast cancer    History of lumpectomy    HTN (hypertension)    Musculoskeletal disorder    sciatic nerve problems    Osteoporosis screening    Personal history of antineoplastic chemotherapy    Status post mastectomy    Thrombocytosis    Uncontrolled hypertension    Visual impairment       Past Surgical History:   Procedure Laterality Date    Chemotherapy  1990    right breast ca    Hysterectomy  1979    States one ovary left in place.    Lumpectomy left  2010    Mastectomy right  1990    Radiation left  2010       Prescriptions Prior to Admission[1]  Current Medications and Prescriptions Ordered in Epic[2]    Allergies[3]    Family History   Problem Relation Age of Onset    Other (Other) Father         Cirrhosis    Heart  Disease Mother     Other (atrial fib) Sister     Other (bowel resection) Sister     Other (alive and well) Daughter         x3    Breast Cancer Self         rt 1990 & lt 2010     Social History     Socioeconomic History    Marital status:     Number of children: 3   Occupational History    Occupation: retired      Comment: office work    Tobacco Use    Smoking status: Every Day     Current packs/day: 0.04     Average packs/day: (2.1 ttl pk-yrs)     Types: Cigarettes     Passive exposure: Current    Smokeless tobacco: Never    Tobacco comments:     About 4 cigarrettes a day   Vaping Use    Vaping status: Never Used   Substance and Sexual Activity    Alcohol use: Yes     Alcohol/week: 4.0 standard drinks of alcohol     Types: 4 Cans of beer per week    Drug use: No   Other Topics Concern     Service No    Caffeine Concern Yes     Comment: Coffee 3 cups daily, Soda    Occupational Exposure No       Available pre-op labs reviewed.             Vital Signs:  Body mass index is 19.69 kg/m².   height is 1.676 m (5' 6\") and weight is 55.3 kg (122 lb). Her blood pressure is 140/73 and her pulse is 84. Her respiration is 23 and oxygen saturation is 99%.   Vitals:    02/25/25 1338 03/03/25 0939   BP:  140/73   Pulse:  84   Resp:  23   SpO2:  99%   Weight: 55.3 kg (122 lb)    Height: 1.676 m (5' 6\")         Anesthesia Evaluation     Patient summary reviewed    No history of anesthetic complications   Airway   Mallampati: II  TM distance: >3 FB  Neck ROM: full  Dental    (+) lower dentures and upper dentures    Pulmonary - negative ROS and normal exam   Cardiovascular - normal exam  (+) hypertension    ECG reviewed  ROS comment: Hx of DVT  ON PLAVIX LAST TAKEN 2/25/25    Neuro/Psych - negative ROS     GI/Hepatic/Renal    (+) bowel prep    Endo/Other      Comments: HX OF BREAST CANCER RIGHT  Abdominal  - normal exam                 Anesthesia Plan:   ASA:  3  Plan:   MAC  Informed Consent Plan and Risks Discussed  With:  Patient  Discussed plan with:  Surgeon      I have informed Emily Rice and/or legal guardian or family member of the nature of the anesthetic plan, benefits, risks including possible dental damage if relevant, major complications, and any alternative forms of anesthetic management.   All of the patient's questions were answered to the best of my ability. The patient desires the anesthetic management as planned.  Erika Joshua, CRNA  3/3/2025 10:43 AM  Present on Admission:  **None**           [1]   Medications Prior to Admission   Medication Sig Dispense Refill Last Dose/Taking    losartan 100 MG Oral Tab Take 1 tablet (100 mg total) by mouth daily. 90 tablet 3 3/2/2025    CLOPIDOGREL 75 MG Oral Tab TAKE 1 TABLET(75 MG) BY MOUTH DAILY 90 tablet 1 2025    amLODIPine 10 MG Oral Tab Take 1 tablet (10 mg total) by mouth daily. 90 tablet 3 3/2/2025    cholecalciferol 50 MCG (2000 UT) Oral Cap Take 1 capsule (2,000 Units total) by mouth daily.   2025    polyethylene glycol, PEG 3350-KCl-NaBcb-NaCl-NaSulf, 236 g Oral Recon Soln Take 4,000 mL by mouth As Directed. Take 2,000 mL the night before your procedure and 2,000 mL the morning of your procedure. 1 each 0     [] amoxicillin clavulanate 875-125 MG Oral Tab Take 1 tablet by mouth 2 (two) times daily for 14 days. 28 tablet 0     [] aspirin 81 MG Oral Tab EC Take 1 tablet (81 mg total) by mouth daily. 30 tablet 0     atorvastatin 40 MG Oral Tab Take 1 tablet (40 mg total) by mouth nightly. (Patient not taking: Reported on 2024) 90 tablet 3    [2]   Current Facility-Administered Medications Ordered in Epic   Medication Dose Route Frequency Provider Last Rate Last Admin    lactated ringers infusion   Intravenous Continuous Safia Bajwa MD         No current [facility]-ordered outpatient medications on file.   [3] No Known Allergies

## 2025-03-03 NOTE — ANESTHESIA POSTPROCEDURE EVALUATION
Patient: Emily Rice    Procedure Summary       Date: 03/03/25 Room / Location: [facility] ENDOSCOPY 04 / [facility] ENDOSCOPY    Anesthesia Start: 1049 Anesthesia Stop: 1143    Procedure: COLONOSCOPY with polypectomy Diagnosis:       Acute diverticulitis      (colon polyp, hemorrhoids, diverticulosis, poor prep, tortuous colon)    Surgeons: Safia Bajwa MD Anesthesiologist: Erika Joshua CRNA    Anesthesia Type: MAC ASA Status: 3            Anesthesia Type: No value filed.    Vitals Value Taken Time   BP 81/45 03/03/25 1141   Temp 36 03/03/25 1145   Pulse 72 03/03/25 1145   Resp 20 03/03/25 1145   SpO2 95 % 03/03/25 1145   Vitals shown include unfiled device data.    [facility] AN Post Evaluation:   Patient Evaluated in PACU  Patient Participation: complete - patient participated  Level of Consciousness: responsive to verbal stimuli  Pain Score: 0  Pain Management: adequate  Airway Patency:patent  Dental exam unchanged from preop  Yes    Nausea/Vomiting: none  Cardiovascular Status: stable  Respiratory Status: room air and spontaneous ventilation  Postoperative Hydration stable      Erika Joshua CRNA  3/3/2025 11:45 AM

## 2025-03-04 VITALS
SYSTOLIC BLOOD PRESSURE: 120 MMHG | BODY MASS INDEX: 19.61 KG/M2 | HEART RATE: 63 BPM | DIASTOLIC BLOOD PRESSURE: 56 MMHG | OXYGEN SATURATION: 99 % | WEIGHT: 122 LBS | HEIGHT: 66 IN | RESPIRATION RATE: 16 BRPM

## 2025-03-19 DIAGNOSIS — I77.9 CAROTID ARTERY DISEASE WITHOUT CEREBRAL INFARCTION: Primary | ICD-10-CM

## 2025-03-19 NOTE — TELEPHONE ENCOUNTER
Pended to [facility] Rx clopidogrel 75 MG Oral Tab, Take 1 tablet (75 mg total) by mouth daily., Disp: 90 tablet, Rfl: 1

## 2025-03-20 RX ORDER — CLOPIDOGREL BISULFATE 75 MG/1
75 TABLET ORAL DAILY
Qty: 90 TABLET | Refills: 1 | Status: SHIPPED | OUTPATIENT
Start: 2025-03-20

## 2025-04-01 ENCOUNTER — TELEPHONE (OUTPATIENT)
Facility: CLINIC | Age: 80
End: 2025-04-01

## 2025-04-01 NOTE — TELEPHONE ENCOUNTER
----- Message from Safia Bajwa sent at 3/31/2025  2:46 PM CDT -----  Given poor prep, large adenomatous polyp recall colonoscopy in 1 year

## 2025-04-01 NOTE — TELEPHONE ENCOUNTER
1  year colonoscopy recall entered. Health maintenance updated.    Colonoscopy done on 3/3/25 and next due on 3/3/26.

## 2025-05-29 ENCOUNTER — TELEPHONE (OUTPATIENT)
Dept: INTERNAL MEDICINE CLINIC | Facility: CLINIC | Age: 80
End: 2025-05-29

## 2025-08-08 RX ORDER — AMLODIPINE BESYLATE 10 MG/1
10 TABLET ORAL DAILY
Qty: 90 TABLET | Refills: 3 | Status: SHIPPED | OUTPATIENT
Start: 2025-08-08

## 2025-08-22 ENCOUNTER — PATIENT MESSAGE (OUTPATIENT)
Dept: INTERNAL MEDICINE CLINIC | Facility: CLINIC | Age: 80
End: 2025-08-22

## 2025-08-22 RX ORDER — LOSARTAN POTASSIUM 100 MG/1
100 TABLET ORAL DAILY
Qty: 90 TABLET | Refills: 3 | Status: CANCELLED | OUTPATIENT
Start: 2025-08-22

## (undated) DEVICE — MEDI-VAC NON-CONDUCTIVE SUCTION TUBING 6MM X 1.8M (6FT.) L: Brand: CARDINAL HEALTH

## (undated) DEVICE — KIT CLEAN ENDOKIT 1.1OZ GOWNX2

## (undated) DEVICE — 60 ML SYRINGE REGULAR TIP: Brand: MONOJECT

## (undated) DEVICE — V2 SPECIMEN COLLECTION MANIFOLD KIT: Brand: NEPTUNE

## (undated) DEVICE — KIT ENDO ORCAPOD 160/180/190

## (undated) DEVICE — Device

## (undated) DEVICE — GIJAW SINGLE-USE BIOPSY FORCEPS WITH NEEDLE: Brand: GIJAW

## (undated) NOTE — LETTER
01/28/21        [facility]      Dear Humaira James,    Our records indicate that you have outstanding lab work and or testing that was ordered for you and has not yet been completed:  Orders Placed This Encounter

## (undated) NOTE — LETTER
[facility]  Administration of Anesthesia  Emily CORDOVA agree to be cared for by a physician anesthesiologist alone and/or with a nurse anesthetist, who is specially trained to monitor me and give me medicine to put me to sleep or keep me comfortable during my procedure    I understand that my anesthesiologist and/or anesthetist is not an employee or agent of [facility] or [employer]. He or she works for [facility]    As the patient asking for anesthesia services, I agree to:  Allow the anesthesiologist (anesthesia doctor) to give me medicine and do additional procedures as necessary. Some examples are: Starting or using an “IV” to give me medicine, fluids or blood during my procedure, and having a breathing tube placed to help me breathe when I’m asleep (intubation). In the event that my heart stops working properly, I understand that my anesthesiologist will make every effort to sustain my life, unless otherwise directed by [facility] Do Not Resuscitate documents.  Tell my anesthesia doctor before my procedure:  If I am pregnant.  The last time that I ate or drank.  iii. All of the medicines I take (including prescriptions, herbal supplements, and pills I can buy without a prescription (including street drugs/illegal medications). Failure to inform my anesthesiologist about these medicines may increase my risk of anesthetic complications.  iv.If I am allergic to anything or have had a reaction to anesthesia before.  I understand how the anesthesia medicine will help me (benefits).  I understand that with any type of anesthesia medicine there are risks:  The most common risks are: nausea, vomiting, sore throat, muscle soreness, damage to my eyes, mouth, or teeth (from breathing tube placement).  Rare risks include: remembering what happened during my procedure, allergic reactions to medications, injury to my airway, heart, lungs, vision, nerves, or  muscles and in extremely rare instances death.  My doctor has explained to me other choices available to me for my care (alternatives).  Pregnant Patients (“epidural”):  I understand that the risks of having an epidural (medicine given into my back to help control pain during labor), include itching, low blood pressure, difficulty urinating, headache or slowing of the baby’s heart. Very rare risks include infection, bleeding, seizure, irregular heart rhythms and nerve injury.  Regional Anesthesia (“spinal”, “epidural”, & “nerve blocks”):  I understand that rare but potential complications include headache, bleeding, infection, seizure, irregular heart rhythms, and nerve injury.    _____________________________________________________________________________  Patient (or Representative) Signature/Relationship to Patient  Date   Time    _____________________________________________________________________________   Name (if used)    Language/Organization   Time    _____________________________________________________________________________  Nurse Anesthetist Signature     Date   Time  _____________________________________________________________________________  Anesthesiologist Signature     Date   Time  I have discussed the procedure and information above with the patient (or patient’s representative) and answered their questions. The patient or their representative has agreed to have anesthesia services.    _____________________________________________________________________________  Witness        Date   Time  I have verified that the signature is that of the patient or patient’s representative, and that it was signed before the procedure  Patient Name: Emily Rice     : 9/3/1945                 Printed: 2025 at 9:21 AM    Medical Record #: [id number]                                            Page 1 of 1  ----------ANESTHESIA CONSENT----------

## (undated) NOTE — LETTER
[facility]  [address], [place], [place]    Authorization for Surgical Operation and Procedure                               I hereby authorize Safia Bajwa MD, my physician and his/her assistants (if applicable), which may include medical students, residents, and/or fellows, to perform the following surgical operation/ procedure and administer such anesthesia as may be determined necessary by my physician: Operation/Procedure name (s) COLONOSCOPY on Emily GALLO Dwayne   2.   I recognize that during the surgical operation/procedure, unforeseen conditions may necessitate additional or different procedures than those listed above.  I, therefore, further authorize and request that the above-named surgeon, assistants, or designees perform such procedures as are, in their judgment, necessary and desirable.    3.   My surgeon/physician has discussed prior to my surgery the potential benefits, risks and side effects of this procedure; the likelihood of achieving goals; and potential problems that might occur during recuperation.  They also discussed reasonable alternatives to the procedure, including risks, benefits, and side effects related to the alternatives and risks related to not receiving this procedure.  I have had all my questions answered and I acknowledge that no guarantee has been made as to the result that may be obtained.    4.   Should the need arise during my operation/procedure, which includes change of level of care prior to discharge, I also consent to the administration of blood and/or blood products.  Further, I understand that despite careful testing and screening of blood or blood products by collecting agencies, I may still be subject to ill effects as a result of receiving a blood transfusion and/or blood products.  The following are some, but not all, of the potential risks that can occur: fever and allergic reactions, hemolytic reactions, transmission of diseases such as  Hepatitis, AIDS and Cytomegalovirus (CMV) and fluid overload.  In the event that I wish to have an autologous transfusion of my own blood, or a directed donor transfusion, I will discuss this with my physician.  Check only if Refusing Blood or Blood Products  I understand refusal of blood or blood products as deemed necessary by my physician may have serious consequences to my condition to include possible death. I hereby assume responsibility for my refusal and release the hospital, its personnel, and my physicians from any responsibility for the consequences of my refusal.    o  Refuse   5.   I authorize the use of any specimen, organs, tissues, body parts or foreign objects that may be removed from my body during the operation/procedure for diagnosis, research or teaching purposes and their subsequent disposal by hospital authorities.  I also authorize the release of specimen test results and/or written reports to my treating physician on the hospital medical staff or other referring or consulting physicians involved in my care, at the discretion of the Pathologist or my treating physician.    6.   I consent to the photographing or videotaping of the operations or procedures to be performed, including appropriate portions of my body for medical, scientific, or educational purposes, provided my identity is not revealed by the pictures or by descriptive texts accompanying them.  If the procedure has been photographed/videotaped, the surgeon will obtain the original picture, image, videotape or CD.  The hospital will not be responsible for storage, release or maintenance of the picture, image, tape or CD.    7.   I consent to the presence of a  or observers in the operating room as deemed necessary by my physician or their designees.    8.   I recognize that in the event my procedure results in extended X-Ray/fluoroscopy time, I may develop a skin reaction.    9. If I have a Do Not Attempt  Resuscitation (DNAR) order in place, that status will be suspended while in the operating room, procedural suite, and during the recovery period unless otherwise explicitly stated by me (or a person authorized to consent on my behalf). The surgeon or my attending physician will determine when the applicable recovery period ends for purposes of reinstating the DNAR order.  10. Patients having a sterilization procedure: I understand that if the procedure is successful the results will be permanent and it will therefore be impossible for me to inseminate, conceive, or bear children.  I also understand that the procedure is intended to result in sterility, although the result has not been guaranteed.   11. I acknowledge that my physician has explained sedation/analgesia administration to me including the risk and benefits I consent to the administration of sedation/analgesia as may be necessary or desirable in the judgment of my physician.    I CERTIFY THAT I HAVE READ AND FULLY UNDERSTAND THE ABOVE CONSENT TO OPERATION and/or OTHER PROCEDURE.     ____________________________________  _________________________________        ______________________________  Signature of Patient    Signature of Responsible Person                Printed Name of Responsible Person                                      ____________________________________  _____________________________                ________________________________  Signature of Witness        Date  Time         Relationship to Patient    STATEMENT OF PHYSICIAN My signature below affirms that prior to the time of the procedure; I have explained to the patient and/or his/her legal representative, the risks and benefits involved in the proposed treatment and any reasonable alternative to the proposed treatment. I have also explained the risks and benefits involved in refusal of the proposed treatment and alternatives to the proposed treatment and have answered the patient's  questions. If I have a significant financial interest in a co-management agreement or a significant financial interest in any product or implant, or other significant relationship used in this procedure/surgery, I have disclosed this and had a discussion with my patient.     _____________________________________________________              _____________________________  (Signature of Physician)                                                                                         (Date)                                   (Time)  Patient Name: Emily Rice      : 9/3/1945      Printed: 2025     Medical Record #: [id number]                                      Page 1 of 1

## (undated) NOTE — LETTER
04/26/21        [facility]      Dear Rosita Ramirez,    Our records indicate that you have outstanding lab work and or testing that was ordered for you and has not yet been completed:  Orders Placed This Encounter

## (undated) NOTE — MR AVS SNAPSHOT
[address], [address]  [address] [facility]  [phone]               Thank you for choosing us for your health care visit with Pau Del Rosario MD.  We are glad to serve you and happy to provide you with this summar For medical emergencies, dial 911.            Visit [facility] online at  [email or web address]

## (undated) NOTE — LETTER
10/28/20        [facility]      Dear Aziza Davenport,    Our records indicate that you have outstanding lab work and or testing that was ordered for you and has not yet been completed:  Orders Placed This Encounter

## (undated) NOTE — LETTER
05/29/25  Hello,      This is the [facility], office of Dr. Kimberly Simons       Thank you for putting your trust in [facility].  Our goal is to deliver the highest quality healthcare and an exceptional patient experience. Upon reviewing of your medical record shows you are due for the following:       ANNUAL PHYSICAL- last annual visit was 8-7-24, you will be due soon; please call to schedule                  Please call [phone] to schedule your appointment or schedule online via [employer].     If you changed to a new provider at another facility, please notify the clinic to update your records.     If you had any recent testing at another facility, please have your results faxed to our office at [phone].      Thank you and have a great day!